# Patient Record
Sex: FEMALE | URBAN - METROPOLITAN AREA
[De-identification: names, ages, dates, MRNs, and addresses within clinical notes are randomized per-mention and may not be internally consistent; named-entity substitution may affect disease eponyms.]

---

## 2022-07-12 LAB
EXTERNAL HEMATOCRIT: 42 %
EXTERNAL HEMOGLOBIN: 14.5 G/DL
EXTERNAL HEPATITIS B SURFACE ANTIGEN: NEGATIVE
EXTERNAL HIV-1/2 AB-AG: NORMAL
EXTERNAL RUBELLA IGG QUANTITATION: NORMAL
EXTERNAL SYPHILIS RPR SCREEN: NORMAL

## 2022-07-21 LAB
EXTERNAL CHLAMYDIA SCREEN: NEGATIVE
EXTERNAL GONORRHEA SCREEN: NEGATIVE

## 2022-11-19 LAB
EXTERNAL HEMATOCRIT: 35.3 %
EXTERNAL HEMOGLOBIN: 12 G/DL
EXTERNAL PLATELET COUNT: 236 K/ÂΜL
EXTERNAL SYPHILIS RPR SCREEN: NORMAL
GLUCOSE 1H P 50 G GLC PO SERPL-MCNC: 101 MG/DL (ref 70–183)

## 2022-12-13 LAB
EXTERNAL ABO GROUPING: NORMAL
EXTERNAL ANTIBODY SCREEN: NORMAL
EXTERNAL RH FACTOR: POSITIVE

## 2023-02-01 ENCOUNTER — LAB REQUISITION (OUTPATIENT)
Dept: LAB | Facility: HOSPITAL | Age: 28
End: 2023-02-01

## 2023-02-01 DIAGNOSIS — Z36.85 ENCOUNTER FOR ANTENATAL SCREENING FOR STREPTOCOCCUS B: ICD-10-CM

## 2023-02-02 LAB — GP B STREP DNA SPEC QL NAA+PROBE: NEGATIVE

## 2023-02-14 ENCOUNTER — ANESTHESIA EVENT (INPATIENT)
Dept: ANESTHESIOLOGY | Facility: HOSPITAL | Age: 28
End: 2023-02-14

## 2023-02-14 ENCOUNTER — HOSPITAL ENCOUNTER (INPATIENT)
Facility: HOSPITAL | Age: 28
LOS: 2 days | Discharge: HOME | End: 2023-02-16
Attending: OBSTETRICS & GYNECOLOGY | Admitting: OBSTETRICS & GYNECOLOGY

## 2023-02-14 ENCOUNTER — ANESTHESIA (INPATIENT)
Dept: ANESTHESIOLOGY | Facility: HOSPITAL | Age: 28
End: 2023-02-14

## 2023-02-14 PROBLEM — Z3A.38 38 WEEKS GESTATION OF PREGNANCY: Status: ACTIVE | Noted: 2023-02-14

## 2023-02-14 PROBLEM — Z37.9 NORMAL LABOR: Status: ACTIVE | Noted: 2023-02-14

## 2023-02-14 LAB
ABO GROUP BLD: NORMAL
BASE EXCESS BLDCOA CALC-SCNC: -7.9 MMOL/L (ref 3–11)
BASE EXCESS BLDCOV CALC-SCNC: -5.4 MMOL/L (ref 1–9)
BLD GP AB SCN SERPL QL: NEGATIVE
ERYTHROCYTE [DISTWIDTH] IN BLOOD BY AUTOMATED COUNT: 13.2 % (ref 11.6–15.1)
HCO3 BLDCOA-SCNC: 17.7 MMOL/L (ref 17.3–27.3)
HCO3 BLDCOV-SCNC: 20.6 MMOL/L (ref 12.2–28.6)
HCT VFR BLD AUTO: 36.4 % (ref 34.8–46.1)
HGB BLD-MCNC: 12.3 G/DL (ref 11.5–15.4)
HOLD SPECIMEN: NORMAL
MCH RBC QN AUTO: 29.4 PG (ref 26.8–34.3)
MCHC RBC AUTO-ENTMCNC: 33.8 G/DL (ref 31.4–37.4)
MCV RBC AUTO: 87 FL (ref 82–98)
O2 CT VFR BLDCOA CALC: 12.8 ML/DL
OXYHGB MFR BLDCOA: 55.6 %
OXYHGB MFR BLDCOV: 57 %
PCO2 BLDCOA: 36.8 MM[HG] (ref 30–60)
PCO2 BLDCOV: 41.6 MM HG (ref 27–43)
PH BLDCOA: 7.3 [PH] (ref 7.23–7.43)
PH BLDCOV: 7.31 [PH] (ref 7.19–7.49)
PLATELET # BLD AUTO: 161 THOUSANDS/UL (ref 149–390)
PMV BLD AUTO: 11.5 FL (ref 8.9–12.7)
PO2 BLDCOA: 24.8 MM HG (ref 5–25)
PO2 BLDCOV: 25.5 MM HG (ref 15–45)
RBC # BLD AUTO: 4.18 MILLION/UL (ref 3.81–5.12)
RH BLD: POSITIVE
RPR SER QL: NORMAL
SAO2 % BLDCOV: 13.2 ML/DL
SPECIMEN EXPIRATION DATE: NORMAL
WBC # BLD AUTO: 13.69 THOUSAND/UL (ref 4.31–10.16)

## 2023-02-14 PROCEDURE — 0KQM0ZZ REPAIR PERINEUM MUSCLE, OPEN APPROACH: ICD-10-PCS | Performed by: OBSTETRICS & GYNECOLOGY

## 2023-02-14 RX ORDER — IBUPROFEN 600 MG/1
600 TABLET ORAL EVERY 6 HOURS SCHEDULED
Status: DISCONTINUED | OUTPATIENT
Start: 2023-02-14 | End: 2023-02-16 | Stop reason: HOSPADM

## 2023-02-14 RX ORDER — ACETAMINOPHEN 325 MG/1
650 TABLET ORAL EVERY 4 HOURS PRN
Status: DISCONTINUED | OUTPATIENT
Start: 2023-02-14 | End: 2023-02-15

## 2023-02-14 RX ORDER — OXYTOCIN/RINGER'S LACTATE 30/500 ML
250 PLASTIC BAG, INJECTION (ML) INTRAVENOUS ONCE
Status: COMPLETED | OUTPATIENT
Start: 2023-02-14 | End: 2023-02-14

## 2023-02-14 RX ORDER — SODIUM CHLORIDE, SODIUM LACTATE, POTASSIUM CHLORIDE, CALCIUM CHLORIDE 600; 310; 30; 20 MG/100ML; MG/100ML; MG/100ML; MG/100ML
125 INJECTION, SOLUTION INTRAVENOUS CONTINUOUS
Status: DISCONTINUED | OUTPATIENT
Start: 2023-02-14 | End: 2023-02-14

## 2023-02-14 RX ORDER — ROPIVACAINE HYDROCHLORIDE 2 MG/ML
INJECTION, SOLUTION EPIDURAL; INFILTRATION; PERINEURAL AS NEEDED
Status: DISCONTINUED | OUTPATIENT
Start: 2023-02-14 | End: 2023-02-14 | Stop reason: HOSPADM

## 2023-02-14 RX ORDER — SIMETHICONE 80 MG
80 TABLET,CHEWABLE ORAL 4 TIMES DAILY PRN
Status: DISCONTINUED | OUTPATIENT
Start: 2023-02-14 | End: 2023-02-16 | Stop reason: HOSPADM

## 2023-02-14 RX ORDER — DOCUSATE SODIUM 100 MG/1
100 CAPSULE, LIQUID FILLED ORAL 2 TIMES DAILY
Status: DISCONTINUED | OUTPATIENT
Start: 2023-02-14 | End: 2023-02-16 | Stop reason: HOSPADM

## 2023-02-14 RX ORDER — ONDANSETRON 2 MG/ML
4 INJECTION INTRAMUSCULAR; INTRAVENOUS EVERY 8 HOURS PRN
Status: DISCONTINUED | OUTPATIENT
Start: 2023-02-14 | End: 2023-02-16 | Stop reason: HOSPADM

## 2023-02-14 RX ORDER — DIPHENHYDRAMINE HCL 25 MG
25 TABLET ORAL EVERY 6 HOURS PRN
Status: DISCONTINUED | OUTPATIENT
Start: 2023-02-14 | End: 2023-02-16 | Stop reason: HOSPADM

## 2023-02-14 RX ORDER — DIAPER,BRIEF,INFANT-TODD,DISP
1 EACH MISCELLANEOUS DAILY PRN
Status: DISCONTINUED | OUTPATIENT
Start: 2023-02-14 | End: 2023-02-16 | Stop reason: HOSPADM

## 2023-02-14 RX ORDER — LIDOCAINE HYDROCHLORIDE AND EPINEPHRINE 15; 5 MG/ML; UG/ML
INJECTION, SOLUTION EPIDURAL AS NEEDED
Status: DISCONTINUED | OUTPATIENT
Start: 2023-02-14 | End: 2023-02-14 | Stop reason: HOSPADM

## 2023-02-14 RX ORDER — OXYTOCIN/RINGER'S LACTATE 30/500 ML
PLASTIC BAG, INJECTION (ML) INTRAVENOUS
Status: COMPLETED
Start: 2023-02-14 | End: 2023-02-14

## 2023-02-14 RX ORDER — CALCIUM CARBONATE 200(500)MG
1000 TABLET,CHEWABLE ORAL DAILY PRN
Status: DISCONTINUED | OUTPATIENT
Start: 2023-02-14 | End: 2023-02-16 | Stop reason: HOSPADM

## 2023-02-14 RX ORDER — BUPIVACAINE HYDROCHLORIDE 2.5 MG/ML
30 INJECTION, SOLUTION EPIDURAL; INFILTRATION; INTRACAUDAL ONCE AS NEEDED
Status: DISCONTINUED | OUTPATIENT
Start: 2023-02-14 | End: 2023-02-14

## 2023-02-14 RX ORDER — ONDANSETRON 2 MG/ML
4 INJECTION INTRAMUSCULAR; INTRAVENOUS EVERY 6 HOURS PRN
Status: DISCONTINUED | OUTPATIENT
Start: 2023-02-14 | End: 2023-02-14

## 2023-02-14 RX ADMIN — Medication 250 MILLI-UNITS/MIN: at 06:47

## 2023-02-14 RX ADMIN — ROPIVACAINE HYDROCHLORIDE 4 ML: 2 INJECTION EPIDURAL; INFILTRATION; PERINEURAL at 02:29

## 2023-02-14 RX ADMIN — ROPIVACAINE HYDROCHLORIDE: 2 INJECTION, SOLUTION EPIDURAL; INFILTRATION at 02:39

## 2023-02-14 RX ADMIN — IBUPROFEN 600 MG: 600 TABLET, FILM COATED ORAL at 17:58

## 2023-02-14 RX ADMIN — ONDANSETRON HYDROCHLORIDE 4 MG: 2 SOLUTION INTRAMUSCULAR; INTRAVENOUS at 01:39

## 2023-02-14 RX ADMIN — DOCUSATE SODIUM 100 MG: 100 CAPSULE, LIQUID FILLED ORAL at 17:58

## 2023-02-14 RX ADMIN — SODIUM CHLORIDE, SODIUM LACTATE, POTASSIUM CHLORIDE, AND CALCIUM CHLORIDE 125 ML/HR: .6; .31; .03; .02 INJECTION, SOLUTION INTRAVENOUS at 04:11

## 2023-02-14 RX ADMIN — ROPIVACAINE HYDROCHLORIDE 10 ML/HR: 2 INJECTION EPIDURAL; INFILTRATION; PERINEURAL at 02:39

## 2023-02-14 RX ADMIN — SODIUM CHLORIDE, SODIUM LACTATE, POTASSIUM CHLORIDE, AND CALCIUM CHLORIDE 999 ML/HR: .6; .31; .03; .02 INJECTION, SOLUTION INTRAVENOUS at 02:12

## 2023-02-14 RX ADMIN — ROPIVACAINE HYDROCHLORIDE 4 ML: 2 INJECTION EPIDURAL; INFILTRATION; PERINEURAL at 02:33

## 2023-02-14 RX ADMIN — IBUPROFEN 600 MG: 600 TABLET, FILM COATED ORAL at 07:40

## 2023-02-14 RX ADMIN — WITCH HAZEL 1 PAD.: 500 SOLUTION RECTAL; TOPICAL at 09:06

## 2023-02-14 RX ADMIN — LIDOCAINE HYDROCHLORIDE AND EPINEPHRINE 3 ML: 15; 5 INJECTION, SOLUTION EPIDURAL at 02:25

## 2023-02-14 RX ADMIN — SODIUM CHLORIDE, SODIUM LACTATE, POTASSIUM CHLORIDE, AND CALCIUM CHLORIDE 999 ML/HR: .6; .31; .03; .02 INJECTION, SOLUTION INTRAVENOUS at 01:15

## 2023-02-14 RX ADMIN — IBUPROFEN 600 MG: 600 TABLET, FILM COATED ORAL at 12:33

## 2023-02-14 RX ADMIN — BENZOCAINE AND LEVOMENTHOL 1 APPLICATION.: 200; 5 SPRAY TOPICAL at 09:06

## 2023-02-14 NOTE — OB LABOR/OXYTOCIN SAFETY PROGRESS
Labor Progress Note - Mook Childers 33 y o  female MRN: [id number]    Unit/Bed#: L&D 323-01 Encounter: [id number]                 Cervical Dilation: 7        Cervical Effacement: 100  Fetal Station: 0     Fetal Heart Rate: 145 BPM                  Vital Signs:   Vitals:    02/14/23 0034   BP: 139/86   Pulse: 77   Resp: 18   Temp: 97 6 °F (36 4 °C)       Notes/comments:     Called to the bedside as patient feels the urge to push  On cervical exam, she is 7/100/0 and still intact  She is very uncomfortable and requesting an epidural  FHT is category I  Plan for epidural at this time  D/W Dr Ellen Parker         [place], [place] 2/14/2023 2:15 AM

## 2023-02-14 NOTE — LACTATION NOTE
This note was copied from a baby's chart  CONSULT - LACTATION  Baby Boy Michelle Keane) Mary 0 days male MRN: [id number]    [facility] Room / Bed: L&D 304(N)/L&D 304(n) Encounter: [id number]    Maternal Information     MOTHER:  Tameka Reza  Maternal Age: 33 y o    OB History: # 1 - Date: 23, Sex: Male, Weight: 2955 g (6 lb 8 2 oz), GA: 38w4d, Delivery: Vaginal, Spontaneous, Apgar1: 7, Apgar5: 9, Living: Living, Birth Comments: None   Previouse breast reduction surgery? No    Lactation history:   Has patient previously breast fed: No   How long had patient previously breast fed:     Previous breast feeding complications:       Past Surgical History:   Procedure Laterality Date   • TONSILLECTOMY  2014        Birth information:  YOB: 2022   Time of birth: 6:44 AM   Sex: male   Delivery type: Vaginal, Spontaneous   Birth Weight: 2955 g (6 lb 8 2 oz)   Percent of Weight Change: 0%     Gestational Age: 38w3d   [unfilled]    Assessment     Breast and nipple assessment: normal assessment    [place] Assessment: normal assessment    Feeding assessment: feeding well  LATCH:  Latch: Grasps breast, tongue down, lips flanged, rhythmic sucking   Audible Swallowing: Spontaneous and intermittent (24 hours old)   Type of Nipple: Everted (After stimulation)   Comfort (Breast/Nipple): Soft/non-tender   Hold (Positioning): Full assist, staff holds infant at breast   LATCH Score: 8         Having latch problems? 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[employer]; Football   Breasts/Nipples   Left Breast Soft   Right Breast Soft   Left Nipple Everted   Right Nipple Everted   Intervention Hand expression   Breastfeeding Progress Not yet established;Breastfeeding well   Patient Follow-Up   Lactation Consult Status 2   Follow-Up Type Inpatient;Call as needed   Other OB Lactation Documentation    Additional Problem Noted Family called for assistance with feeding Michael at the breast   (Reviewed RSB  D/C booklet at bedside)       Feeding recommendations:  breast feed on demand     Information on hand expression given  Discussed benefits of knowing how to manually express breast including stimulating milk supply, softening nipple for latch and evacuating breast in the event of engorgement  Reviewed how to bring baby to the breast so that his lower lip and chin touch the breast with his nose just above the nipple to encourage a wider, more asymmetric latch  Met with mother  Provided mother with Ready, Set, Baby booklet which contained information on:  Hand expression with access to QR codes to review hand expression  Positioning and latch reviewed as well as showing images of other feeding positions  Discussed the properties of a good latch in any position  Feeding on cue and what that means for recognizing infant's hunger, s/s that baby is getting enough milk and some s/s that breastfeeding dyad may need further help  Skin to Skin contact an benefits to mom and baby  Avoidance of pacifiers for the first month discussed  Gave information on common concerns, what to expect the first few weeks after delivery, preparing for other caregivers, and how partners can help  Resources for support also provided  Encouraged parents to call for assistance, questions, and concerns about breastfeeding  Extension provided        Amado Fairbanks RN 2/14/2023 11:49 AM

## 2023-02-14 NOTE — H&P
H&P Exam - Obstetrics   Brii Cardenas 33 y o  female MRN: [id number]  Unit/Bed#: L&D 323-01 Encounter: [id number]      ASSESSMENT:  33 y  o yo  at 38w4d weeks gestation who is being admitted for active labor  EFW: 7lbs by Leopold's  VTX by transabdominal ultrasound    PLAN:    38 weeks gestation of pregnancy  Assessment & Plan  38w4d  Intact  GBS neg  Expectant management    * Normal labor  Assessment & Plan  Admit to L&D  CBC, RPR, Type & Screen  SVE:   FHT: category I  Clinical EFW: 7lbs by Leopold's ; Vertex confirmed by TAUS  GBS status: negative   Postpartum contraception plan: declines  Analgesia at maternal request  Expectant management      Discussed with Dr Georgia Lea      This patient will be an INPATIENT  and I certify the anticipated length of stay is >2 Midnights  History of Present Illness     Chief Complaint: Active labor    HPI:  Brii Cardenas is a 33 y o   female with an STALIN of 2023, by Last Menstrual Period at 38w4d weeks gestation who is being admitted for active labor  She states that she was having contractions [id number] until on 23 AM  She was able to go to work  She states that at 4pm she started having regular strong contractions  She is unsure if she broke her water, but states that she had a "large gush" at the time when her contractions got stronger  She denies vaginal bleeding and decreased fetal movement  Contractions: yes q2-3 mins  Loss of fluid: yes  Vaginal bleeding: no  Fetal movement: yes    She is SOLO patient  PREGNANCY COMPLICATIONS:   1) Pregnancy at 38w4d    OB History    Para Term  AB Living   1             SAB IAB Ectopic Multiple Live Births                  # Outcome Date GA Lbr Jeronimo/2nd Weight Sex Delivery Anes PTL Lv   1 Current                Baby complications/comments: none    Review of Systems   Constitutional: Negative for chills and fever  HENT: Negative for ear pain and sore throat      Eyes: Negative for pain and visual disturbance  Respiratory: Negative for cough and shortness of breath  Cardiovascular: Negative for chest pain and palpitations  Gastrointestinal: Positive for abdominal pain and diarrhea  Negative for vomiting  Genitourinary: Negative for dysuria and hematuria  Musculoskeletal: Negative for arthralgias and back pain  Skin: Negative for color change and rash  Neurological: Negative for seizures and syncope  All other systems reviewed and are negative  Historical Information   No past medical history on file  No past surgical history on file  Social History   Social History     Substance and Sexual Activity   Alcohol Use Not on file     Social History     Substance and Sexual Activity   Drug Use Not on file     Social History     Tobacco Use   Smoking Status Not on file   Smokeless Tobacco Not on file     Family History: non-contributory    Meds/Allergies      No medications prior to admission  No Known Allergies    OBJECTIVE:    Vitals: Blood pressure 139/86, pulse 77, temperature 97 6 °F (36 4 °C), temperature source Oral, resp  rate 18, height 5' 6" (1 676 m), weight 72 6 kg (160 lb), last menstrual period 05/20/2022  Body mass index is 25 82 kg/m²  Physical Exam  Vitals reviewed  Constitutional:       Appearance: Normal appearance  HENT:      Head: Normocephalic and atraumatic  Eyes:      Extraocular Movements: Extraocular movements intact  Cardiovascular:      Rate and Rhythm: Normal rate  Pulses: Normal pulses  Pulmonary:      Effort: Pulmonary effort is normal  No respiratory distress  Abdominal:      Palpations: Abdomen is soft  Tenderness: There is no abdominal tenderness  Comments: Gravid uterus   Genitourinary:     General: Normal vulva  Musculoskeletal:         General: Normal range of motion  Cervical back: Normal range of motion  Skin:     General: Skin is warm and dry  Neurological:      Mental Status: She is alert  Psychiatric:         Mood and Affect: Mood normal          Behavior: Behavior normal        Speculum exam: visually dilated w/intact membranes    Cervix:  Cervical Dilation: 6  Cervical Effacement: 90  Cervical Consistency: Soft  Fetal Station: -1  Presentation: Vertex  Method: Manual  OB Examiner: Javier    Fetal heart rate:    category I    [place]:    q2-3mins    GBS: negative    Prenatal Labs: I have personally reviewed pertinent reports    , Blood Type:   Lab Results   Component Value Date/Time    ABO Grouping A 12/13/2022 12:00 AM     , D (Rh type): No results found for: RH  , Antibody Screen: No results found for: ANTIBODYSCR , HCT/HGB:   Lab Results   Component Value Date/Time    Hematocrit 36 4 02/14/2023 01:13 AM    Hemoglobin 12 3 02/14/2023 01:13 AM      , MCV:   Lab Results   Component Value Date/Time    MCV 87 02/14/2023 01:13 AM      , Platelets:   Lab Results   Component Value Date/Time    Platelets 307 84/93/0986 01:13 AM      , 1 hour Glucola:   Lab Results   Component Value Date/Time    Glucose 101 11/19/2022 12:00 AM   , 3 hour GTT: No results found for: YNSOIYT0RY, Varicella: No results found for: VARICELLAIGG    , Rubella: No results found for: RUBELLAIGGQT     , VDRL/RPR:   Lab Results   Component Value Date/Time    RPR Non-Reactive 11/19/2022 12:00 AM      , Urine Culture/Screen: No results found for: URINECX    , Urine Drug Screen: No results found for: AMPHETUR, BARBTUR, BDZUR, THCUR, COCAINEUR, METHADONEUR, OPIATEUR, PCPUR, MTHAMUR, ECSTASYUR, TRICYCLICSUR, Hep B:   Lab Results   Component Value Date/Time    Hepatitis B Surface Ag negative 07/12/2022 12:00 AM     , Hep C: No components found for: HEPCSAG, EXTHEPCSAG   , HIV:   Lab Results   Component Value Date/Time    HIV-1/HIV-2 AB Non-Reactive 07/12/2022 12:00 AM     , Chlamydia:   Lab Results   Component Value Date/Time    External Chlamydia Screen negative 07/21/2022 12:00 AM     , Gonorrhea: No results found for: LABNGO  , Group B Strep:    Lab Results   Component Value Date/Time    Strep Grp B PCR Negative 01/31/2023 04:52 PM          Invasive Devices     Peripheral Intravenous Line  Duration           Peripheral IV 02/14/23 Left Hand <1 day                Aye Hurley MD  OBGYN PGY-2  2/14/2023  1:02 AM

## 2023-02-14 NOTE — OB LABOR/OXYTOCIN SAFETY PROGRESS
Labor Progress Note - Serina Fearing 33 y o  female MRN: [id number]    Unit/Bed#: L&D 323-01 Encounter: [id number]       Contraction Frequency (minutes): 3-3 5  Contraction Quality: Moderate  Tachysystole: No   Cervical Dilation: 7        Cervical Effacement: 100  Fetal Station: 0  Baseline Rate: 135 bpm  Fetal Heart Rate: 145 BPM     Vital Signs:   Vitals:    02/14/23 0249   BP: 113/61   Pulse: 101   Resp:    Temp:        Notes/comments:     Present at the bedside for prolonged 5-6 minute deceleration with a pool to the 70s  On cervical exam, she is unchanged at 7/100/0  FHT returned to baseline with occasional variable decelerations with changes in maternal position and oxygen administration  Dr Rochelle Yap made aware  Plan to continue expectant management       Mavis Carrero [place] 2/14/2023 3:04 AM

## 2023-02-14 NOTE — ANESTHESIA POSTPROCEDURE EVALUATION
Post-Op Assessment Note    CV Status:  Stable    Pain management: adequate     Mental Status:  Alert and awake   Hydration Status:  Euvolemic   PONV Controlled:  Controlled   Airway Patency:  Patent      Post Op Vitals Reviewed: Yes      Staff: Anesthesiologist         No notable events documented      /78 (02/14/23 0706)    Temp      Pulse 91 (02/14/23 0706)   Resp      SpO2

## 2023-02-14 NOTE — ANESTHESIA PREPROCEDURE EVALUATION
Procedure:  LABOR ANALGESIA    Relevant Problems   GYN   (+) 38 weeks gestation of pregnancy        Physical Exam    Airway    Mallampati score: II         Dental   No notable dental hx     Cardiovascular  Cardiovascular exam normal    Pulmonary  Pulmonary exam normal     Other Findings        Anesthesia Plan  ASA Score- 2     Anesthesia Type- epidural with ASA Monitors  Additional Monitors:   Airway Plan:           Plan Factors-    Chart reviewed  Existing labs reviewed  Patient is not a current smoker  Patient instructed to abstain from smoking on day of procedure  Patient did not smoke on day of surgery  Obstructive sleep apnea risk education given perioperatively  Induction-     Postoperative Plan-     Informed Consent- Anesthetic plan and risks discussed with patient

## 2023-02-14 NOTE — ANESTHESIA PROCEDURE NOTES
Epidural Block    Patient location during procedure: OB  Start time: 2/14/2023 2:24 AM  Reason for block: procedure for pain and at surgeon's request  Staffing  Performed: Anesthesiologist   Anesthesiologist: Benedict Ta DO  Preanesthetic Checklist  Completed: patient identified, IV checked, site marked, risks and benefits discussed, surgical consent, monitors and equipment checked, pre-op evaluation and timeout performed  Epidural  Patient position: sitting  Prep: Betadine  Patient monitoring: frequent blood pressure checks  Location: lumbar  Injection technique: DONNA air  Needle  Needle type: Tuohy   Needle gauge: 18 G  Catheter type: side hole  Catheter size: 20 G  Catheter at skin depth: 11 cm  Catheter securement method: clear occlusive dressing  Test dose: negative  Assessment  Number of attempts: 1negative aspiration for CSF, negative aspiration for heme and no paresthesia on injection  patient tolerated the procedure well with no immediate complications

## 2023-02-14 NOTE — L&D DELIVERY NOTE
DELIVERY NOTE  Torrey Carter 33 y o  female MRN: [id number]  Unit/Bed#: L&D 323-01 Encounter: [id number]    Obstetrician:    Dr Tamir Kauffman DO    Assistant:   Dr Allison Alicia    Pre-Delivery Diagnosis:   Patient Active Problem List   Diagnosis   • 38 weeks gestation of pregnancy   • Normal labor     Post-Delivery Diagnosis:   Same as above - Delivered  Viable male fetus  2nd degree laceration    Procedure:  Spontaneous vaginal delivery  Repair 2nd degree spontaneous laceration      Findings:  1  Viable male  delivered on 23 at [phone] with weight pending at time of dictation,  Apgar scores of 7 at one minute and 9 at five minutes  2  Spontaneous delivery of placenta with centrally inserted 3-vessel cord  3  Clear amniotic fluid  4  2nd degree perineal laceration, repaired with 3-0 vicryl rapide    Specimens:   Cord blood obtained   Placenta; normal appearing, central insertion, intact   Arterial and venous blood gases (below)     Gases:  Umbilical Cord Venous Blood Gas:  Results from last 7 days   Lab Units 23  0645   PH COV  7 312   PCO2 COV mm HG 41 6   HCO3 COV mmol/L 20 6   BASE EXC COV mmol/L -5 4*   O2 CT CD VB mL/dL 13 2   O2 HGB, VENOUS CORD % 58 2     Umbilical Cord Arterial Blood Gas:  Results from last 7 days   Lab Units 23  0645   PH COA  7 300   PCO2 COA  36 8   PO2 COA mm HG 24 8   HCO3 COA mmol/L 17 7   BASE EXC COA mmol/L -7 9*   O2 CONTENT CORD ART ml/dl 12 8   O2 HGB, ARTERIAL CORD % 55 6       Quantitative Blood Loss:   0 mL           Complications:    None       Brief labor course:   Torrey Carter is a 33 y o   at 38w3d  She presented to labor and delivery for contractions  Her pregnancy was uncomplicated  On exam in triage she was noted to be 1  She was admitted for active labor  She received an epidural for pain management  FHT was mostly category I with occasional variable and one prolonged deceleration which resolved with maternal repositioning   She continued to make change to completely dilated and began pushing  Procedure Details      Description of procedure     After pushing for 48 minutes, on 23 at 0644 patient delivered a viable male , Apgars of 7 (1 min) and 9 (5 min)  The fetal vertex delivered spontaneously  There was no nuchal cord  With the assistance of maternal expulsive efforts and gentle downward traction of the fetal head, the anterior (right) shoulder was delivered without difficulty, followed by the remainder of the infant's body and contralateral arm  After delivery of the , delayed clamping of the umbilical cord was undertaken for 30 seconds  The  was noted to have good tone and cry spontaneously  There was no apparent injury to the   The cord was then doubly clamped and cut and the  was passed off to  staff for routine care  Umbilical cord blood and umbilical artery and venous gases were collected  Placenta was delivered with fundal massage and gentle traction on the cord with active management of the third stage of labor  Placenta delivered intact with a 3-vessel cord  Active management of the third stage of labor was undertaken with IV pitocin at 250 milliunits/min  Bleeding was noted to be under control   Outcome:  Living  with APGARS 7, 9 at 1 and 5 minutes, respectively   weight pending    Perineal Inspection/Laceration Repair    The vagina, cervix, perineum, and rectum were inspected and there was noted to be a 2nd degree laceration that required repair  Under adequate anesthesia, the apex of the vaginal laceration was identified and an anchoring suture was placed 1 cm above the apex  The vaginal mucosa and underlying rectovaginal fascia were closed using a running locked 3-0 Vicryl-CT 1 suture to the hymenal ring  The suture was then brought underneath the hymenal ring  A stitch was then placed through the bulbocavernosus muscle and tied  Continuing with the same suture, the transverse perineal muscles were reapproximated with 2 transverse running sutures  The suture was brought to the posterior apex of the skin laceration and then the skin was reapproximated in a subcuticular fashion to the hymenal ring  Excellent hemostasis and cosmesis was achieved  Conclusion:  Mother and baby are currently recovering nicely in stable condition  Attending Supervision:   Dr Amparo Badillo, DO was present for the entire procedure  Chantel Garcia MD  OB/GYN PGY-2  2/14/2023  7:06 AM

## 2023-02-14 NOTE — ASSESSMENT & PLAN NOTE
Recovering well   Encourage Ambulation  Encourage breastfeeding  Tylenol/Motrin for pain   Rh +, rhogam not indicated   Undecided about discharge home today vs tomorrow

## 2023-02-14 NOTE — OB LABOR/OXYTOCIN SAFETY PROGRESS
Labor Progress Note - Klever Magdaleno 33 y o  female MRN: [id number]    Unit/Bed#: L&D 323-01 Encounter: [id number]       Contraction Frequency (minutes): 3-3 5  Contraction Quality: Moderate  Tachysystole: No   Cervical Dilation: Lip/rim (Comment)        Cervical Effacement: 100  Fetal Station: 2  Baseline Rate: 135 bpm  Fetal Heart Rate: 145 BPM    Vital Signs:   Vitals:    02/14/23 0449   BP: 95/54   Pulse: 75   Resp:    Temp:    SpO2:        Notes/comments:     Arcelia Schmidt is feeling intermittent pressure  On cervical exam, she is 9 5/100/+2  FHT is category I  Approximately 1 hour she was intermittent category II with intermittent variable decelerations  Discussed with Dr Bay Rand  Plan to recheck in 1 hour or sooner as clinically indicated         [place], [place] 2/14/2023 5:07 AM

## 2023-02-14 NOTE — PLAN OF CARE
Problem: PAIN - ADULT  Goal: Verbalizes/displays adequate comfort level or baseline comfort level  Description: Interventions:  - Encourage patient to monitor pain and request assistance  - Assess pain using appropriate pain scale  - Administer analgesics based on type and severity of pain and evaluate response  - Implement non-pharmacological measures as appropriate and evaluate response  - Consider cultural and social influences on pain and pain management  - Notify physician/advanced practitioner if interventions unsuccessful or patient reports new pain  Outcome: Progressing     Problem: INFECTION - ADULT  Goal: Absence or prevention of progression during hospitalization  Description: INTERVENTIONS:  - Assess and monitor for signs and symptoms of infection  - Monitor lab/diagnostic results  - Monitor all insertion sites, i e  indwelling lines, tubes, and drains  - Monitor endotracheal if appropriate and nasal secretions for changes in amount and color  - [place] appropriate cooling/warming therapies per order  - Administer medications as ordered  - Instruct and encourage patient and family to use good hand hygiene technique  - Identify and instruct in appropriate isolation precautions for identified infection/condition  Outcome: Progressing  Goal: Absence of fever/infection during neutropenic period  Description: INTERVENTIONS:  - Monitor WBC    Outcome: Progressing     Problem: SAFETY ADULT  Goal: Patient will remain free of falls  Description: INTERVENTIONS:  - Educate patient/family on patient safety including physical limitations  - Instruct patient to call for assistance with activity   - Consult OT/PT to assist with strengthening/mobility   - Keep Call bell within reach  - Keep bed low and locked with side rails adjusted as appropriate  - Keep care items and personal belongings within reach  - Initiate and maintain comfort rounds  Outcome: Progressing  Goal: Maintain or return to baseline ADL function  Description: INTERVENTIONS:  -  Assess patient's ability to carry out ADLs; assess patient's baseline for ADL function and identify physical deficits which impact ability to perform ADLs (bathing, care of mouth/teeth, toileting, grooming, dressing, etc )  - Assess/evaluate cause of self-care deficits   - Assess range of motion  - Assess patient's mobility; develop plan if impaired  - Assess patient's need for assistive devices and provide as appropriate  - Encourage maximum independence but intervene and supervise when necessary  - Involve family in performance of ADLs  - Assess for home care needs following discharge   - Consider OT consult to assist with ADL evaluation and planning for discharge  - Provide patient education as appropriate  Outcome: Progressing  Goal: Maintains/Returns to pre admission functional level  Description: INTERVENTIONS:  - Perform BMAT or MOVE assessment daily    - Set and communicate daily mobility goal to care team and patient/family/caregiver  - Collaborate with rehabilitation services on mobility goals if consulted  - Record patient progress and toleration of activity level   Outcome: Progressing     Problem: Knowledge Deficit  Goal: Patient/family/caregiver demonstrates understanding of disease process, treatment plan, medications, and discharge instructions  Description: Complete learning assessment and assess knowledge base    Interventions:  - Provide teaching at level of understanding  - Provide teaching via preferred learning methods  Outcome: Progressing     Problem: DISCHARGE PLANNING  Goal: Discharge to home or other facility with appropriate resources  Description: INTERVENTIONS:  - Identify barriers to discharge w/patient and caregiver  - Arrange for needed discharge resources and transportation as appropriate  - Identify discharge learning needs (meds, wound care, etc )  - Arrange for interpretive services to assist at discharge as needed  - Refer to Case Management Department for coordinating discharge planning if the patient needs post-hospital services based on physician/advanced practitioner order or complex needs related to functional status, cognitive ability, or social support system  Outcome: Progressing

## 2023-02-14 NOTE — DISCHARGE SUMMARY
Discharge Summary - OB/GYN  Bambi Wheeler 33 y o  female MRN: [id number]  Unit/Bed#: L&D 323-01 Encounter: [id number]    Admission Date: 2023     Discharge Date: 2023    Admitting Attending: Michael Arthur DO    Delivering Attending: Dr Roseanne Arevalo DO    Discharging Attending: Dr Hima Williamson DO    Principal Diagnosis: Pregnancy at 38w4d    Secondary Diagnosis:   1  None    Procedures: spontaneous vaginal delivery    Anesthesia: epidural    Hospital course: Bambi Wheeler is a 33 y o   at 38w3d  She presented to labor and delivery for contractions  Her pregnancy was uncomplicated  On exam in triage she was noted to be /1  She was admitted for active labor  She received an epidural for pain management  FHT was mostly category I with occasional variable and one prolonged deceleration which resolved with maternal repositioning  She continued to make change to completely dilated and began pushing  She delivered a viable male  on 2023 at [phone]  Weight 6lbs 8 2oz via normal spontaneous vaginal delivery  She sustained a 2nd degree laceration during delivery which was adequately repaired  Apgars were 7 (1 min) and 9 (5 min)   was transferred to  nursery  Patient tolerated the procedure well  Her post-delivery course was uncomplicated  Her postpartum pain was well controlled with oral analgesics  On day of discharge, she was ambulating and able to reasonably perform all ADLs  She was voiding and had appropriate bowel function  Pain was well controlled  She was discharged home on postpartum day #2 without complications  Patient was instructed to follow up with her OB as an outpatient and was given appropriate warnings to call provider if she develops signs of infection or uncontrolled pain      Complications: none apparent    Condition at discharge: stable     Discharge instructions/Information to patient and family:   -Do not place anything (no partner, tampons or douche) in your vagina for 6 weeks  -You may walk for exercise for the first 6 weeks then gradually return to your usual activities    -Please do not drive for 1 week if you have no stitches and for 2 weeks if you have stitches or underwent a  delivery     -You may take baths or shower per your preference    -Please look at your bust (breasts) in the mirror daily and call your doctor for redness or tenderness or increased warmth  - If you have had a  section please look at your incision daily as well and call provider for increasing redness or steady drainage from the incision    -Please call your doctor's office if temperature > 100 4*F or 38* C, worsening pain or a foul discharge   -See after visit summary for more information for patient and family  Provisions for Follow-Up Care:  See after visit summary for information related to follow-up care and any pertinent home health orders  Disposition: See After Visit Summary for discharge disposition information  Planned Readmission: No    Discharge medications and instructions:   Prenatal vitamin daily for 6 months or the duration of nursing, whichever is longer    Motrin 600 mg orally every 6 hours as needed for pain  Tylenol (over the counter) per bottle directions as needed for pain  Hydrocortisone cream 1% (over the counter) applied 1-2x daily to perineum as needed  Witch hazel pads for perineal discomfort as needed    Lucy Foster MD

## 2023-02-14 NOTE — LACTATION NOTE
This note was copied from a baby's chart  02/14/23 1400   Lactation Consultation   Reason for Consult 10 min   LATCH Documentation   Latch 2   Audible Swallowing 2   Type of Nipple 2   Comfort (Breast/Nipple) 2   Hold (Positioning) 1   LATCH Score 9   Having latch problems? 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Football   Breasts/Nipples   Intervention Hand expression   Breastfeeding Progress Not yet established;Breastfeeding well   Patient Follow-Up   Lactation Consult Status 2   Follow-Up Type Inpatient;Call as needed   Other OB Lactation Documentation    Additional Problem Noted Family called for assistance with latching Michael  Reassurance with positioning offered  Gamal Ford was able to [address] with less intervention  Encouraged parents to call for assistance, questions, and concerns about breastfeeding  Extension provided

## 2023-02-15 RX ORDER — ACETAMINOPHEN 325 MG/1
650 TABLET ORAL EVERY 6 HOURS SCHEDULED
Refills: 0 | Status: CANCELLED
Start: 2023-02-15

## 2023-02-15 RX ORDER — ACETAMINOPHEN 325 MG/1
650 TABLET ORAL EVERY 6 HOURS SCHEDULED
Status: DISCONTINUED | OUTPATIENT
Start: 2023-02-15 | End: 2023-02-16 | Stop reason: HOSPADM

## 2023-02-15 RX ADMIN — DOCUSATE SODIUM 100 MG: 100 CAPSULE, LIQUID FILLED ORAL at 18:05

## 2023-02-15 RX ADMIN — ACETAMINOPHEN 650 MG: 325 TABLET ORAL at 18:05

## 2023-02-15 RX ADMIN — ACETAMINOPHEN 650 MG: 325 TABLET ORAL at 12:42

## 2023-02-15 RX ADMIN — IBUPROFEN 600 MG: 600 TABLET, FILM COATED ORAL at 18:05

## 2023-02-15 RX ADMIN — IBUPROFEN 600 MG: 600 TABLET, FILM COATED ORAL at 12:42

## 2023-02-15 RX ADMIN — DOCUSATE SODIUM 100 MG: 100 CAPSULE, LIQUID FILLED ORAL at 09:14

## 2023-02-15 RX ADMIN — ACETAMINOPHEN 650 MG: 325 TABLET ORAL at 06:37

## 2023-02-15 RX ADMIN — IBUPROFEN 600 MG: 600 TABLET, FILM COATED ORAL at 06:37

## 2023-02-15 RX ADMIN — IBUPROFEN 600 MG: 600 TABLET, FILM COATED ORAL at 23:08

## 2023-02-15 RX ADMIN — IBUPROFEN 600 MG: 600 TABLET, FILM COATED ORAL at 00:18

## 2023-02-15 NOTE — PLAN OF CARE
Problem: PAIN - ADULT  Goal: Verbalizes/displays adequate comfort level or baseline comfort level  Description: Interventions:  - Encourage patient to monitor pain and request assistance  - Assess pain using appropriate pain scale  - Administer analgesics based on type and severity of pain and evaluate response  - Implement non-pharmacological measures as appropriate and evaluate response  - Consider cultural and social influences on pain and pain management  - Notify physician/advanced practitioner if interventions unsuccessful or patient reports new pain  Outcome: Progressing     Problem: INFECTION - ADULT  Goal: Absence or prevention of progression during hospitalization  Description: INTERVENTIONS:  - Assess and monitor for signs and symptoms of infection  - Monitor lab/diagnostic results  - Monitor all insertion sites, i e  indwelling lines, tubes, and drains  - Monitor endotracheal if appropriate and nasal secretions for changes in amount and color  - [place] appropriate cooling/warming therapies per order  - Administer medications as ordered  - Instruct and encourage patient and family to use good hand hygiene technique  - Identify and instruct in appropriate isolation precautions for identified infection/condition  Outcome: Progressing  Goal: Absence of fever/infection during neutropenic period  Description: INTERVENTIONS:  - Monitor WBC    Outcome: Progressing     Problem: SAFETY ADULT  Goal: Patient will remain free of falls  Description: INTERVENTIONS:  - Educate patient/family on patient safety including physical limitations  - Instruct patient to call for assistance with activity   - Consult OT/PT to assist with strengthening/mobility   - Keep Call bell within reach  - Keep bed low and locked with side rails adjusted as appropriate  - Keep care items and personal belongings within reach  - Initiate and maintain comfort rounds  - Make Fall Risk Sign visible to staff  - Offer Toileting every Hours, in advance of need  - Initiate/Maintain alarm  - Obtain necessary fall risk management equipment:  - Apply yellow socks and bracelet for high fall risk patients  - Consider moving patient to room near nurses station  Outcome: Progressing  Goal: Maintain or return to baseline ADL function  Description: INTERVENTIONS:  -  Assess patient's ability to carry out ADLs; assess patient's baseline for ADL function and identify physical deficits which impact ability to perform ADLs (bathing, care of mouth/teeth, toileting, grooming, dressing, etc )  - Assess/evaluate cause of self-care deficits   - Assess range of motion  - Assess patient's mobility; develop plan if impaired  - Assess patient's need for assistive devices and provide as appropriate  - Encourage maximum independence but intervene and supervise when necessary  - Involve family in performance of ADLs  - Assess for home care needs following discharge   - Consider OT consult to assist with ADL evaluation and planning for discharge  - Provide patient education as appropriate  Outcome: Progressing  Goal: Maintains/Returns to pre admission functional level  Description: INTERVENTIONS:  - Perform BMAT or MOVE assessment daily    - Set and communicate daily mobility goal to care team and patient/family/caregiver  - Collaborate with rehabilitation services on mobility goals if consulted  - Perform Range of Motion  times a day  - Reposition patient every  hours    - Dangle patient  times a day  - Stand patient times a day  - Ambulate patient  times a day  - Out of bed to chair  times a day   - Out of bed for meals  times a day  - Out of bed for toileting  - Record patient progress and toleration of activity level   Outcome: Progressing     Problem: Knowledge Deficit  Goal: Patient/family/caregiver demonstrates understanding of disease process, treatment plan, medications, and discharge instructions  Description: Complete learning assessment and assess knowledge base   Interventions:  - Provide teaching at level of understanding  - Provide teaching via preferred learning methods  Outcome: Progressing     Problem: DISCHARGE PLANNING  Goal: Discharge to home or other facility with appropriate resources  Description: INTERVENTIONS:  - Identify barriers to discharge w/patient and caregiver  - Arrange for needed discharge resources and transportation as appropriate  - Identify discharge learning needs (meds, wound care, etc )  - Arrange for interpretive services to assist at discharge as needed  - Refer to Case Management Department for coordinating discharge planning if the patient needs post-hospital services based on physician/advanced practitioner order or complex needs related to functional status, cognitive ability, or social support system  Outcome: Progressing

## 2023-02-15 NOTE — LACTATION NOTE
This note was copied from a baby's chart  CONSULT - LACTATION  Baby Boy Guanako Lomas) Mary 1 days male MRN: [id number]    [address] [facility] NURSERY Room / Bed: L&D 304(N)/L&D 304(n) Encounter: [id number]    Maternal Information     MOTHER:  Arielle Carty  Maternal Age: 33 y o    OB History: # 1 - Date: 23, Sex: Male, Weight: 2955 g (6 lb 8 2 oz), GA: 38w4d, Delivery: Vaginal, Spontaneous, Apgar1: 7, Apgar5: 9, Living: Living, Birth Comments: None   Previouse breast reduction surgery? No    Lactation history:   Has patient previously breast fed: No   How long had patient previously breast fed:     Previous breast feeding complications:       Past Surgical History:   Procedure Laterality Date   • TONSILLECTOMY          Birth information:  YOB: 2022   Time of birth: 6:44 AM   Sex: male   Delivery type: Vaginal, Spontaneous   Birth Weight: 2955 g (6 lb 8 2 oz)   Percent of Weight Change: -3%     Gestational Age: 38w3d   [unfilled]    Assessment     Breast and nipple assessment: sore nipple     Assessment: normal assessment    Feeding assessment: feeding well with guidance    LATCH:  Latch: Grasps breast, tongue down, lips flanged, rhythmic sucking   Audible Swallowing: Spontaneous and intermittent (24 hours old)   Type of Nipple: Everted (After stimulation)   Comfort (Breast/Nipple): Filling, red/small blisters/bruises, mild/moderate discomfort   Hold (Positioning): Partial assist, teach one side, mother does other, staff holds   DEPAU CENTER Score: 6          Feeding recommendations:  breast feed on demand     Admits to sore nipples  Information given about sore nipples and how to correct with positioning techniques  Discussed maneuvers to latch infant on properly to avoid nipple pain and promote healing  Discussed treatments that could be utilized to promote healing   Hydro gel dressings given with instruction and verbalization of understanding of cleaning and duration of use  Encouraged to call for latch assist     Verbal review of good latch/feed earlier  Parents called in now for assistance with positioning and latch  Mom chose right breast  I encouraged football due to hand dominance on this side  Worked on positioning infant up at chest level and starting to feed infant with nose arriving at the nipple  Then, using areolar compression to achieve a deep latch that is comfortable and exchanges optimum amounts of milk  Guided baby to deep latch  Stimulated to suck, converting to rocker suckling with breast softening till baby slid down  Burp attempt  Then Mom wanted to try football hold on left breast also due to nipple soreness on that side  Guided to deep latch  Stimulated to suck converting to rocker suckling with breast softening till popped off with relaxed tone  Mom notes less pain after latch on tenderness  No nipple compression and better suckling  Dad remains supportive at bedside  Also reviewed discharge breastfeeding booklet including the feeding log  Emphasized 8 or more (12) feedings in a 24 hour period, what to expect for the number of diapers per day of life and the progression of properties of the  stooling pattern  Reviewed breastfeeding and your lifestyle, storage and preparation of breast milk, how to keep you breast pump clean, the employed breastfeeding mother and paced bottle feeding handouts  Booklet included Breastfeeding Resources for after discharge including access to the number for the [address]  Encouraged parents to call for assistance, questions, and concerns about breastfeeding  Extension provided            Axel Davidson RN 2/15/2023 3:10 PM

## 2023-02-15 NOTE — PROGRESS NOTES
Progress Note - OB/GYN  Bambi Wheeler 33 y o  female MRN: [id number]  Unit/Bed#: L&D 304-01 Encounter: [id number]    Assessment and Plan     Bambi Wheeler is a patient of: [facility]  She is PPD# 1 s/p Spontaneous vaginal delivery and repair of second degree perineal laceration  Recovering well and is stable         *  (spontaneous vaginal delivery)  Assessment & Plan  Recovering well   Encourage Ambulation  Encourage breastfeeding  Tylenol/Motrin for pain   Rh +, rhogam not indicated   Undecided about discharge home today vs tomorrow       Disposition    - Anticipate discharge home on POD# 1-2      Subjective/Objective     Chief Complaint: Postpartum State     Subjective:    Bambi Wheeler is PPD#1 s/p Spontaneous vaginal delivery  She has no current complaints  Pain is well controlled  Patient is currently voiding  She is ambulating  Patient is currently passing flatus and has had no bowel movement  She is tolerating PO, and denies nausea or vomitting  Patient denies fever, chills, chest pain, shortness of breath, or calf tenderness  Lochia is normal  She is  Breastfeeding  She is recovering well and is stable         Vitals:   /70 (BP Location: Right arm)   Pulse 66   Temp 98 1 °F (36 7 °C) (Temporal)   Resp 18   Ht 5' 6" (1 676 m)   Wt 72 6 kg (160 lb)   LMP 2022   SpO2 99%   Breastfeeding Yes   BMI 25 82 kg/m²       Intake/Output Summary (Last 24 hours) at 2/15/2023 0556  Last data filed at 2023 2205  Gross per 24 hour   Intake --   Output 1950 ml   Net -1950 ml       Invasive Devices     Peripheral Intravenous Line  Duration           Peripheral IV 23 Left Hand 1 day                Physical Exam:   GEN: Bambi Wheeler appears well, alert, pleasant and cooperative   CARDIO: Regular rate  RESP:  Normal effort   ABDOMEN: soft, no tenderness, no distention, fundus @ umbillicus  EXTREMITIES: non-tender, no erythema       Labs:     Hemoglobin   Date Value Ref Range Status   02/14/2023 12 3 11 5 - 15 4 g/dL Final     External Hemoglobin   Date Value Ref Range Status   11/19/2022 12 0 g/dL Final   07/12/2022 14 5 g/dL Final     WBC   Date Value Ref Range Status   02/14/2023 13 69 (H) 4 31 - 10 16 Thousand/uL Final     Platelets   Date Value Ref Range Status   02/14/2023 161 149 - 390 Thousands/uL Final     External Platelets   Date Value Ref Range Status   11/19/2022 236 K/µL Final          Marlene Mckeon MD  2/15/2023  5:56 AM

## 2023-02-16 VITALS
BODY MASS INDEX: 25.71 KG/M2 | RESPIRATION RATE: 18 BRPM | SYSTOLIC BLOOD PRESSURE: 120 MMHG | TEMPERATURE: 98 F | HEIGHT: 66 IN | OXYGEN SATURATION: 99 % | DIASTOLIC BLOOD PRESSURE: 87 MMHG | HEART RATE: 79 BPM | WEIGHT: 160 LBS

## 2023-02-16 PROBLEM — Z3A.38 38 WEEKS GESTATION OF PREGNANCY: Status: RESOLVED | Noted: 2023-02-14 | Resolved: 2023-02-16

## 2023-02-16 RX ORDER — IBUPROFEN 200 MG
600 TABLET ORAL EVERY 6 HOURS SCHEDULED
Start: 2023-02-16

## 2023-02-16 RX ORDER — IBUPROFEN 600 MG/1
600 TABLET ORAL EVERY 6 HOURS SCHEDULED
Qty: 30 TABLET | Refills: 0
Start: 2023-02-16

## 2023-02-16 RX ORDER — ACETAMINOPHEN 325 MG/1
650 TABLET ORAL EVERY 6 HOURS SCHEDULED
Refills: 0
Start: 2023-02-16

## 2023-02-16 RX ADMIN — ACETAMINOPHEN 650 MG: 325 TABLET ORAL at 14:02

## 2023-02-16 RX ADMIN — IBUPROFEN 600 MG: 600 TABLET, FILM COATED ORAL at 06:19

## 2023-02-16 RX ADMIN — DOCUSATE SODIUM 100 MG: 100 CAPSULE, LIQUID FILLED ORAL at 07:47

## 2023-02-16 RX ADMIN — IBUPROFEN 600 MG: 600 TABLET, FILM COATED ORAL at 12:08

## 2023-02-16 RX ADMIN — BENZOCAINE AND LEVOMENTHOL 1 APPLICATION.: 200; 5 SPRAY TOPICAL at 07:46

## 2023-02-16 RX ADMIN — ACETAMINOPHEN 650 MG: 325 TABLET ORAL at 07:46

## 2023-02-16 RX ADMIN — ACETAMINOPHEN 650 MG: 325 TABLET ORAL at 01:59

## 2023-02-16 NOTE — PROGRESS NOTES
Progress Note - OB/GYN  Jonh Gunderson 33 y o  female MRN: [id number]  Unit/Bed#: L&D 304-01 Encounter: [id number]    Assessment and Plan     Jonh Gunderson is a patient of: [facility]  She is PPD# 2 s/p  spontaneous vaginal delivery  Recovering well and is stable       *  (spontaneous vaginal delivery)  Assessment & Plan  Recovering well   Encourage Ambulation  Encourage breastfeeding  Tylenol/Motrin for pain   Rh +, rhogam not indicated   Undecided about discharge home today vs tomorrow     38 weeks gestation of pregnancy-resolved as of 2023  Assessment & Plan  38w4d  Intact  GBS neg  Expectant management      Disposition    - Anticipate discharge home on PPD# 2      Subjective/Objective     Chief Complaint: Postpartum State     Subjective:    Jonh Gunderson is PPD#2 s/p  spontaneous vaginal delivery  She has no current complaints  Pain is well controlled  Patient is currently voiding  She is ambulating  Patient is currently passing flatus and has had bowel movement  She is tolerating PO, and denies nausea or vomitting  Patient denies fever, chills, chest pain, shortness of breath, or calf tenderness  Lochia is normal  She is  Breastfeeding  She is recovering well and is stable         Vitals:   /80 (BP Location: Right arm)   Pulse 71   Temp 98 4 °F (36 9 °C) (Temporal)   Resp 18   Ht 5' 6" (1 676 m)   Wt 72 6 kg (160 lb)   LMP 2022   SpO2 99%   Breastfeeding Yes   BMI 25 82 kg/m²     No intake or output data in the 24 hours ending 23 0642    Invasive Devices     None                 Physical Exam:   GEN: Jonh Gunderson appears well, alert and oriented x 3, pleasant and cooperative   CARDIO: RRR, no murmurs or rubs  RESP:  CTAB, no wheezes or rales  ABDOMEN: soft, no tenderness, no distention, fundus @ U-2  EXTREMITIES: non tender, no erythema  Labs:     Hemoglobin   Date Value Ref Range Status   2023 12 3 11 5 - 15 4 g/dL Final     External Hemoglobin Date Value Ref Range Status   11/19/2022 12 0 g/dL Final   07/12/2022 14 5 g/dL Final     WBC   Date Value Ref Range Status   02/14/2023 13 69 (H) 4 31 - 10 16 Thousand/uL Final     Platelets   Date Value Ref Range Status   02/14/2023 161 149 - 390 Thousands/uL Final     External Platelets   Date Value Ref Range Status   11/19/2022 236 K/µL Final          Mina Esparza MD  2/16/2023  6:42 AM

## 2023-02-16 NOTE — LACTATION NOTE
This note was copied from a baby's chart  CONSULT - LACTATION  Baby Boy Lala Hernandez 2 days male MRN: [id number]    [address] [facility] NURSERY Room / Bed: L&D 304(N)/L&D 304(n) Encounter: [id number]    Maternal Information     MOTHER:  Vignesh Ng  Maternal Age: 33 y o    OB History: # 1 - Date: 23, Sex: Male, Weight: 2955 g (6 lb 8 2 oz), GA: 38w4d, Delivery: Vaginal, Spontaneous, Apgar1: 7, Apgar5: 9, Living: Living, Birth Comments: None   Previouse breast reduction surgery? No    Lactation history:   Has patient previously breast fed: No   How long had patient previously breast fed:     Previous breast feeding complications:       Past Surgical History:   Procedure Laterality Date   • TONSILLECTOMY  2014        Birth information:  YOB: 2022   Time of birth: 6:44 AM   Sex: male   Delivery type: Vaginal, Spontaneous   Birth Weight: 2955 g (6 lb 8 2 oz)   Percent of Weight Change: -5%     Gestational Age: 38w3d   [unfilled]    Assessment     Breast and nipple assessment: sore nipple    [place] Assessment: normal assessment    Feeding assessment: feeding well  LATCH:  Latch: Grasps breast, tongue down, lips flanged, rhythmic sucking   Audible Swallowing: Spontaneous and intermittent (24 hours old)   Type of Nipple: Everted (After stimulation)   Comfort (Breast/Nipple): Soft/non-tender   Hold (Positioning): No assist from staff, mother able to position/hold infant   LATCH Score: 10         Having latch problems?  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Football   Breasts/Nipples   Left Breast Filling   Right Breast Filling   Left Nipple Everted;Tender   Right Nipple Everted;Tender   Intervention Lanolin   Breastfeeding Progress Breastfeeding well   Other OB Lactation Tools   Feeding Devices Lanolin   Patient Follow-Up   Lactation Consult Status 2   Follow-Up Type Inpatient;Call as needed   Other OB Lactation Documentation    Additional Problem Noted Deepali Braden is appearing more confident with feeding her baby  She is able to identify latching qualities of comfort and swallowing  Her breasts are filling  Reviewed D/C booklet with family an additional time at their request          Feeding recommendations:  breast feed on demand     Met with mother to go over discharge breastfeeding booklet including the feeding log  Emphasized 8 or more (12) feedings in a 24 hour period, what to expect for the number of diapers per day of life and the progression of properties of the  stooling pattern  Reviewed breastfeeding and your lifestyle, storage and preparation of breast milk, how to keep you breast pump clean, the employed breastfeeding mother and paced bottle feeding handouts  Booklet included Breastfeeding Resources for after discharge including access to the number for the [address]  Discussed s/s engorgement, blocked milk ducts, and mastitis  Discussed how to remedy at home and when to contact physician  Encouraged parents to call for assistance, questions, and concerns about breastfeeding  Extension provided        Miguel Chaney RN 2023 11:30 AM

## 2023-02-16 NOTE — PLAN OF CARE
Problem: PAIN - ADULT  Goal: Verbalizes/displays adequate comfort level or baseline comfort level  Description: Interventions:  - Encourage patient to monitor pain and request assistance  - Assess pain using appropriate pain scale  - Administer analgesics based on type and severity of pain and evaluate response  - Implement non-pharmacological measures as appropriate and evaluate response  - Consider cultural and social influences on pain and pain management  - Notify physician/advanced practitioner if interventions unsuccessful or patient reports new pain  Outcome: Adequate for Discharge     Problem: INFECTION - ADULT  Goal: Absence or prevention of progression during hospitalization  Description: INTERVENTIONS:  - Assess and monitor for signs and symptoms of infection  - Monitor lab/diagnostic results  - Monitor all insertion sites, i e  indwelling lines, tubes, and drains  - Monitor endotracheal if appropriate and nasal secretions for changes in amount and color  - Percival appropriate cooling/warming therapies per order  - Administer medications as ordered  - Instruct and encourage patient and family to use good hand hygiene technique  - Identify and instruct in appropriate isolation precautions for identified infection/condition  Outcome: Adequate for Discharge  Goal: Absence of fever/infection during neutropenic period  Description: INTERVENTIONS:  - Monitor WBC    Outcome: Adequate for Discharge     Problem: SAFETY ADULT  Goal: Patient will remain free of falls  Description: INTERVENTIONS:  - Educate patient/family on patient safety including physical limitations  - Instruct patient to call for assistance with activity   - Consult OT/PT to assist with strengthening/mobility   - Keep Call bell within reach  - Keep bed low and locked with side rails adjusted as appropriate  - Keep care items and personal belongings within reach  - Initiate and maintain comfort rounds  - Make Fall Risk Sign visible to staff  - Apply yellow socks and bracelet for high fall risk patients  - Consider moving patient to room near nurses station  Outcome: Adequate for Discharge  Goal: Maintain or return to baseline ADL function  Description: INTERVENTIONS:  -  Assess patient's ability to carry out ADLs; assess patient's baseline for ADL function and identify physical deficits which impact ability to perform ADLs (bathing, care of mouth/teeth, toileting, grooming, dressing, etc )  - Assess/evaluate cause of self-care deficits   - Assess range of motion  - Assess patient's mobility; develop plan if impaired  - Assess patient's need for assistive devices and provide as appropriate  - Encourage maximum independence but intervene and supervise when necessary  - Involve family in performance of ADLs  - Assess for home care needs following discharge   - Consider OT consult to assist with ADL evaluation and planning for discharge  - Provide patient education as appropriate  Outcome: Adequate for Discharge  Goal: Maintains/Returns to pre admission functional level  Description: INTERVENTIONS:  - Perform BMAT or MOVE assessment daily    - Set and communicate daily mobility goal to care team and patient/family/caregiver  - Collaborate with rehabilitation services on mobility goals if consulted  - Out of bed for toileting  - Record patient progress and toleration of activity level   Outcome: Adequate for Discharge     Problem: Knowledge Deficit  Goal: Patient/family/caregiver demonstrates understanding of disease process, treatment plan, medications, and discharge instructions  Description: Complete learning assessment and assess knowledge base    Interventions:  - Provide teaching at level of understanding  - Provide teaching via preferred learning methods  Outcome: Adequate for Discharge     Problem: DISCHARGE PLANNING  Goal: Discharge to home or other facility with appropriate resources  Description: INTERVENTIONS:  - Identify barriers to discharge w/patient and caregiver  - Arrange for needed discharge resources and transportation as appropriate  - Identify discharge learning needs (meds, wound care, etc )  - Arrange for interpretive services to assist at discharge as needed  - Refer to Case Management Department for coordinating discharge planning if the patient needs post-hospital services based on physician/advanced practitioner order or complex needs related to functional status, cognitive ability, or social support system  Outcome: Adequate for Discharge     Problem: POSTPARTUM  Goal: Experiences normal postpartum course  Description: INTERVENTIONS:  - Monitor maternal vital signs  - Assess uterine involution and lochia  Outcome: Adequate for Discharge  Goal: Appropriate maternal -  bonding  Description: INTERVENTIONS:  - Identify family support  - Assess for appropriate maternal/infant bonding   -Encourage maternal/infant bonding opportunities  - Referral to  or  as needed  Outcome: Adequate for Discharge  Goal: Establishment of infant feeding pattern  Description: INTERVENTIONS:  - Assess breast/bottle feeding  - Refer to lactation as needed  Outcome: Adequate for Discharge  Goal: Incision(s), wounds(s) or drain site(s) healing without S/S of infection  Description: INTERVENTIONS  - Assess and document dressing, incision, wound bed, drain sites and surrounding tissue  - Provide patient and family education    Outcome: Adequate for Discharge

## 2023-02-17 ENCOUNTER — HOSPITAL ENCOUNTER (EMERGENCY)
Facility: HOSPITAL | Age: 28
Discharge: HOME | End: 2023-02-17
Attending: EMERGENCY MEDICINE

## 2023-02-17 ENCOUNTER — APPOINTMENT (EMERGENCY)
Dept: CT IMAGING | Facility: HOSPITAL | Age: 28
End: 2023-02-17

## 2023-02-17 VITALS
OXYGEN SATURATION: 99 % | TEMPERATURE: 97.7 F | DIASTOLIC BLOOD PRESSURE: 96 MMHG | SYSTOLIC BLOOD PRESSURE: 138 MMHG | RESPIRATION RATE: 16 BRPM | HEART RATE: 83 BPM

## 2023-02-17 DIAGNOSIS — R07.9 CHEST PAIN: ICD-10-CM

## 2023-02-17 LAB
ALBUMIN SERPL BCP-MCNC: 3.6 G/DL (ref 3.5–5)
ALP SERPL-CCNC: 102 U/L (ref 34–104)
ALT SERPL W P-5'-P-CCNC: 34 U/L (ref 7–52)
ANION GAP SERPL CALCULATED.3IONS-SCNC: 5 MMOL/L (ref 4–13)
AST SERPL W P-5'-P-CCNC: 47 U/L (ref 13–39)
ATRIAL RATE: 74 BPM
BACTERIA UR QL AUTO: ABNORMAL /HPF
BASOPHILS # BLD AUTO: 0.03 THOUSANDS/ÂΜL (ref 0–0.1)
BASOPHILS NFR BLD AUTO: 0 % (ref 0–1)
BILIRUB SERPL-MCNC: 0.36 MG/DL (ref 0.2–1)
BILIRUB UR QL STRIP: NEGATIVE
BNP SERPL-MCNC: 183 PG/ML (ref 0–100)
BUN SERPL-MCNC: 13 MG/DL (ref 5–25)
CALCIUM SERPL-MCNC: 9.1 MG/DL (ref 8.4–10.2)
CARDIAC TROPONIN I PNL SERPL HS: 3 NG/L
CHLORIDE SERPL-SCNC: 109 MMOL/L (ref 96–108)
CLARITY UR: CLEAR
CO2 SERPL-SCNC: 25 MMOL/L (ref 21–32)
COLOR UR: YELLOW
CREAT SERPL-MCNC: 0.6 MG/DL (ref 0.6–1.3)
CREAT UR-MCNC: 54 MG/DL
D DIMER PPP FEU-MCNC: 7.65 UG/ML FEU
EOSINOPHIL # BLD AUTO: 0.07 THOUSAND/ÂΜL (ref 0–0.61)
EOSINOPHIL NFR BLD AUTO: 1 % (ref 0–6)
ERYTHROCYTE [DISTWIDTH] IN BLOOD BY AUTOMATED COUNT: 13.9 % (ref 11.6–15.1)
GFR SERPL CREATININE-BSD FRML MDRD: 126 ML/MIN/1.73SQ M
GLUCOSE SERPL-MCNC: 72 MG/DL (ref 65–140)
GLUCOSE UR STRIP-MCNC: NEGATIVE MG/DL
HCT VFR BLD AUTO: 36.8 % (ref 34.8–46.1)
HGB BLD-MCNC: 12 G/DL (ref 11.5–15.4)
HGB UR QL STRIP.AUTO: ABNORMAL
IMM GRANULOCYTES # BLD AUTO: 0.05 THOUSAND/UL (ref 0–0.2)
IMM GRANULOCYTES NFR BLD AUTO: 1 % (ref 0–2)
KETONES UR STRIP-MCNC: NEGATIVE MG/DL
LEUKOCYTE ESTERASE UR QL STRIP: ABNORMAL
LYMPHOCYTES # BLD AUTO: 1.51 THOUSANDS/ÂΜL (ref 0.6–4.47)
LYMPHOCYTES NFR BLD AUTO: 17 % (ref 14–44)
MCH RBC QN AUTO: 29.5 PG (ref 26.8–34.3)
MCHC RBC AUTO-ENTMCNC: 32.6 G/DL (ref 31.4–37.4)
MCV RBC AUTO: 90 FL (ref 82–98)
MONOCYTES # BLD AUTO: 0.65 THOUSAND/ÂΜL (ref 0.17–1.22)
MONOCYTES NFR BLD AUTO: 7 % (ref 4–12)
MUCOUS THREADS UR QL AUTO: ABNORMAL
NEUTROPHILS # BLD AUTO: 6.57 THOUSANDS/ÂΜL (ref 1.85–7.62)
NEUTS SEG NFR BLD AUTO: 74 % (ref 43–75)
NITRITE UR QL STRIP: NEGATIVE
NON-SQ EPI CELLS URNS QL MICRO: ABNORMAL /HPF
NRBC BLD AUTO-RTO: 0 /100 WBCS
P AXIS: 59 DEGREES
PH UR STRIP.AUTO: 6.5 [PH]
PLATELET # BLD AUTO: 165 THOUSANDS/UL (ref 149–390)
PMV BLD AUTO: 10.8 FL (ref 8.9–12.7)
POTASSIUM SERPL-SCNC: 4 MMOL/L (ref 3.5–5.3)
PR INTERVAL: 122 MS
PROT SERPL-MCNC: 6.5 G/DL (ref 6.4–8.4)
PROT UR STRIP-MCNC: NEGATIVE MG/DL
PROT UR-MCNC: 9 MG/DL
PROT/CREAT UR: 0.17 MG/G{CREAT} (ref 0–0.1)
QRS AXIS: 66 DEGREES
QRSD INTERVAL: 86 MS
QT INTERVAL: 368 MS
QTC INTERVAL: 408 MS
RBC # BLD AUTO: 4.07 MILLION/UL (ref 3.81–5.12)
RBC #/AREA URNS AUTO: ABNORMAL /HPF
SODIUM SERPL-SCNC: 139 MMOL/L (ref 135–147)
SP GR UR STRIP.AUTO: 1.01 (ref 1–1.03)
T WAVE AXIS: 62 DEGREES
UROBILINOGEN UR QL STRIP.AUTO: 0.2 E.U./DL
VENTRICULAR RATE: 74 BPM
WBC # BLD AUTO: 8.88 THOUSAND/UL (ref 4.31–10.16)
WBC #/AREA URNS AUTO: ABNORMAL /HPF

## 2023-02-17 RX ORDER — ACETAMINOPHEN 325 MG/1
975 TABLET ORAL EVERY 6 HOURS PRN
Status: DISCONTINUED | OUTPATIENT
Start: 2023-02-17 | End: 2023-02-17 | Stop reason: HOSPADM

## 2023-02-17 RX ORDER — IBUPROFEN 600 MG/1
600 TABLET ORAL EVERY 6 HOURS PRN
Status: DISCONTINUED | OUTPATIENT
Start: 2023-02-17 | End: 2023-02-17 | Stop reason: HOSPADM

## 2023-02-17 RX ADMIN — SODIUM CHLORIDE 1000 ML: 0.9 INJECTION, SOLUTION INTRAVENOUS at 10:45

## 2023-02-17 RX ADMIN — ACETAMINOPHEN 650 MG: 325 TABLET ORAL at 13:31

## 2023-02-17 RX ADMIN — IBUPROFEN 600 MG: 600 TABLET, FILM COATED ORAL at 13:31

## 2023-02-17 RX ADMIN — IOHEXOL 85 ML: 350 INJECTION, SOLUTION INTRAVENOUS at 11:49

## 2023-02-17 NOTE — CONSULTS
Consult - OB/GYN   [facility] 33 [facility]: [id number]  Unit/Bed#: ED-14 Encounter: [id number]    Assessment:   33 y o  Michael Montes PPD#3 from a uncomplicated spontaneous vaginal delivery now presenting with chest pain    Plan:   * Chest pain  Assessment & Plan  CBC and CMP wnl; P/c ratio 0 17  Trops and EKG wnl    CT PE study: negative  Given Pre-eclampsia precautions  Pt will call OB if symptoms return/worsen  She will be seen in the office next week for BP check  Discussed case and plan w/ Dr Ceci Hernandez    HPI:  She presents with substernal chest pain that started last night  She was discharged from the hospital yesterday  She reports that the pain is substernal and very sharp in nature and constant  It does not radiate anywhere  She does not have any associated shortness of breath, palpitations or tachycardia  She reports she took her blood pressure at home and it was elevated  She reports a very minor headache from stress and lack of sleep  She denies vision changes, RUQ pain, or calf tenderness  Active Problems:  Patient Active Problem List   Diagnosis   •  (spontaneous vaginal delivery)   • Chest pain       PMH:  History reviewed  No pertinent past medical history      PSH:  Past Surgical History:   Procedure Laterality Date   • TONSILLECTOMY         OB History  OB History    Para Term  AB Living   1 1 1     1   SAB IAB Ectopic Multiple Live Births         0 1      # Outcome Date GA Lbr Jeronimo/2nd Weight Sex Delivery Anes PTL Lv   1 Term 23 38w4d / 00:48 2955 g (6 lb 8 2 oz) M Vag-Spont EPI  WISAM       Social Hx:  -/-/-    Meds:  Current Facility-Administered Medications on File Prior to Encounter   Medication Dose Route Frequency Provider Last Rate Last Admin   • [DISCONTINUED] acetaminophen (TYLENOL) tablet 650 mg  650 mg Oral Q6H [address] Ajit Caballero MD   650 mg at 23 1402   • [DISCONTINUED] benzocaine-menthol-lanolin-aloe (DERMOPLAST) 20-0 5 % topical spray 1 application  1 application Topical Y2H PRN Polly Alvarez MD   1 application at 56/06/19 7130   • [DISCONTINUED] calcium carbonate (TUMS) chewable tablet 1,000 mg  1,000 mg Oral Daily PRN Polly Alvarez MD       • [DISCONTINUED] diphenhydrAMINE (BENADRYL) tablet 25 mg  25 mg Oral Q6H PRN Polly Alvarez MD       • [DISCONTINUED] docusate sodium (COLACE) capsule 100 mg  100 mg Oral BID Polly Alvarez MD   100 mg at 02/16/23 0747   • [DISCONTINUED] hydrocortisone 1 % cream 1 application  1 application Topical Daily PRN Polly Alvarez MD       • [DISCONTINUED] ibuprofen (MOTRIN) tablet 600 mg  600 mg Oral Q6H [address] MD   600 mg at 02/16/23 1208   • [DISCONTINUED] ondansetron (ZOFRAN) injection 4 mg  4 mg Intravenous Q8H PRN Polly Alvarez MD       • [DISCONTINUED] simethicone (MYLICON) chewable tablet 80 mg  80 mg Oral 4x Daily PRN Polly Alvarez MD       • [DISCONTINUED] witch hazel-glycerin (TUCKS) topical pad 1 pad  1 pad Topical Q4H PRN Polly Alvarez MD   1 pad at 02/14/23 7798     Current Outpatient Medications on File Prior to Encounter   Medication Sig Dispense Refill   • acetaminophen (TYLENOL) 325 mg tablet Take 2 tablets (650 mg total) by mouth every 6 (six) hours  0   • ibuprofen (MOTRIN) 200 mg tablet Take 3 tablets (600 mg total) by mouth every 6 (six) hours     • Prenatal MV-Min-Fe Fum-FA-DHA (PRENATAL 1 PO) Take by mouth         Allergies:  No Known Allergies    Physical Exam:  /87 (BP Location: Left arm)   Pulse 72   Temp 97 7 °F (36 5 °C) (Oral)   Resp 18   LMP 05/20/2022   SpO2 98%   Breastfeeding Yes     Physical Exam  Vitals reviewed  Constitutional:       Appearance: Normal appearance  Cardiovascular:      Rate and Rhythm: Normal rate and regular rhythm  Pulmonary:      Effort: Pulmonary effort is normal       Breath sounds: Normal breath sounds     Chest: Chest wall: No tenderness  Abdominal:      Palpations: Abdomen is soft  Tenderness: There is no abdominal tenderness  Musculoskeletal:         General: No swelling or tenderness  Skin:     General: Skin is warm and dry  Neurological:      Mental Status: She is alert and oriented to person, place, and time

## 2023-02-17 NOTE — ED NOTES
Pt states she and spouse are agreeable to CT scan  TT to ED provider re: same       Leeann Tapia  02/17/23 1012

## 2023-02-17 NOTE — ED PROVIDER NOTES
History  Chief Complaint   Patient presents with   • Chest Pain     Pt c/o midsternal stabbing chest pain starting last night  Pt just gave birth on 2/14  No complications with delivery  Pt states her BP has been high for her with systolics in the 694J  81-year-old female with no past medical history presents to the ED complaining of chest pain that started last evening  Patient had spontaneous vaginal delivery on 2/14  She has had no complications during this pregnancy  This was her first pregnancy  No complications at delivery  She states she was discharged yesterday and in the evening started to develop midsternal chest pressure  She states the pain seems worse if she leans backwards and seems a little bit better leaning forward  No radiation of the pain  No shortness of breath or diaphoresis  No extremity swelling or facial swelling  She also has noticed her blood pressure elevated at home since last evening  She states it was normal throughout the pregnancy and normal yesterday at discharge  She states her blood pressure was as high as 168/110 at home        History provided by:  Patient   used: No    Chest Pain  Pain location:  Substernal area  Pain quality: pressure    Pain radiates to:  Does not radiate  Pain radiates to the back: no    Onset quality:  Gradual  Duration:  12 hours  Timing:  Constant  Progression:  Waxing and waning  Chronicity:  New  Context: movement and at rest    Relieved by:  Nothing  Worsened by:  Certain positions  Ineffective treatments:  None tried  Associated symptoms: no abdominal pain, no altered mental status, no anorexia, no anxiety, no back pain, no claudication, no cough, no diaphoresis, no dizziness, no dysphagia, no fatigue, no fever, no headache, no heartburn, no lower extremity edema, no nausea, no near-syncope, no numbness, no orthopnea, no palpitations, no PND, no shortness of breath, no syncope, not vomiting and no weakness    Risk factors: no coronary artery disease, no diabetes mellitus, no high cholesterol, no hypertension, no immobilization, not obese, no prior DVT/PE, no smoking and no surgery        Prior to Admission Medications   Prescriptions Last Dose Informant Patient Reported? Taking? Prenatal MV-Min-Fe Fum-FA-DHA (PRENATAL 1 PO)   Yes No   Sig: Take by mouth   acetaminophen (TYLENOL) 325 mg tablet   No Yes   Sig: Take 2 tablets (650 mg total) by mouth every 6 (six) hours   ibuprofen (MOTRIN) 200 mg tablet   No Yes   Sig: Take 3 tablets (600 mg total) by mouth every 6 (six) hours      Facility-Administered Medications: None       History reviewed  No pertinent past medical history  Past Surgical History:   Procedure Laterality Date   • TONSILLECTOMY  2014       History reviewed  No pertinent family history  I have reviewed and agree with the history as documented  E-Cigarette/Vaping   • E-Cigarette Use Never User      E-Cigarette/Vaping Substances   • Nicotine No    • THC No    • CBD No    • Flavoring No    • Other No    • Unknown No      Social History     Tobacco Use   • Smoking status: Never   • Smokeless tobacco: Never   Vaping Use   • Vaping Use: Never used   Substance Use Topics   • Alcohol use: Not Currently   • Drug use: Never       Review of Systems   Constitutional: Negative  Negative for chills, diaphoresis, fatigue and fever  HENT: Negative  Negative for congestion, rhinorrhea, sore throat and trouble swallowing  Eyes: Negative  Negative for discharge, redness and itching  Respiratory: Negative  Negative for apnea, cough, chest tightness, shortness of breath and wheezing  Cardiovascular: Positive for chest pain  Negative for palpitations, orthopnea, claudication, leg swelling, syncope, PND and near-syncope  Gastrointestinal: Negative  Negative for abdominal pain, anorexia, heartburn, nausea and vomiting  Endocrine: Negative  Genitourinary: Negative    Negative for flank pain, frequency and urgency  Musculoskeletal: Negative  Negative for back pain  Skin: Negative  Allergic/Immunologic: Negative  Neurological: Negative  Negative for dizziness, tremors, seizures, syncope, facial asymmetry, speech difficulty, weakness, light-headedness, numbness and headaches  Hematological: Negative  All other systems reviewed and are negative  Physical Exam  Physical Exam  Vitals and nursing note reviewed  Constitutional:       General: She is awake  She is not in acute distress  Appearance: Normal appearance  She is well-developed and normal weight  She is not ill-appearing, toxic-appearing or diaphoretic  HENT:      Head: Normocephalic and atraumatic  Right Ear: External ear normal       Left Ear: External ear normal       Nose: Nose normal       Mouth/Throat:      Pharynx: No oropharyngeal exudate  Eyes:      General:         Right eye: No discharge  Left eye: No discharge  Conjunctiva/sclera: Conjunctivae normal       Pupils: Pupils are equal, round, and reactive to light  Neck:      Thyroid: No thyromegaly  Vascular: No JVD  Trachea: No tracheal deviation  Cardiovascular:      Rate and Rhythm: Normal rate and regular rhythm  Heart sounds: Normal heart sounds  No murmur heard  No friction rub  No gallop  Pulmonary:      Effort: Pulmonary effort is normal  No respiratory distress  Breath sounds: Normal breath sounds  No stridor  No wheezing, rhonchi or rales  Chest:      Chest wall: No tenderness  Abdominal:      General: Bowel sounds are normal  There is no distension  Palpations: Abdomen is soft  There is no mass  Tenderness: There is no abdominal tenderness  Hernia: No hernia is present  Musculoskeletal:         General: No tenderness or deformity  Normal range of motion  Cervical back: Normal range of motion and neck supple  Right lower leg: No edema  Left lower leg: No edema     Lymphadenopathy: Cervical: No cervical adenopathy  Skin:     General: Skin is warm and dry  Coloration: Skin is not jaundiced or pale  Findings: No bruising, erythema, lesion or rash  Neurological:      General: No focal deficit present  Mental Status: She is alert and oriented to person, place, and time  Motor: No weakness or abnormal muscle tone  Deep Tendon Reflexes: Reflexes are normal and symmetric  Psychiatric:         Mood and Affect: Mood normal          Behavior: Behavior is cooperative           Vital Signs  ED Triage Vitals   Temperature Pulse Respirations Blood Pressure SpO2   02/17/23 0652 02/17/23 0652 02/17/23 0652 02/17/23 0652 02/17/23 0652   97 7 °F (36 5 °C) 78 17 158/97 98 %      Temp Source Heart Rate Source Patient Position - Orthostatic VS BP Location FiO2 (%)   02/17/23 0652 02/17/23 0652 02/17/23 0652 02/17/23 0652 --   Oral Monitor Lying Left arm       Pain Score       02/17/23 0817       5           Vitals:    02/17/23 0652 02/17/23 0817 02/17/23 0920 02/17/23 1100   BP: 158/97 139/88 129/90 140/87   Pulse: 78 74 76 72   Patient Position - Orthostatic VS: Lying Lying Lying Lying         Visual Acuity      ED Medications  Medications   acetaminophen (TYLENOL) tablet 975 mg (has no administration in time range)   ibuprofen (MOTRIN) tablet 600 mg (has no administration in time range)   sodium chloride 0 9 % bolus 1,000 mL (1,000 mL Intravenous New Bag 2/17/23 1045)   iohexol (OMNIPAQUE) 350 MG/ML injection (SINGLE-DOSE) 85 mL (85 mL Intravenous Given 2/17/23 1149)       Diagnostic Studies  Results Reviewed     Procedure Component Value Units Date/Time    Urine Microscopic [[id number]]  (Abnormal) Collected: 02/17/23 1043    Lab Status: Final result Specimen: Urine, Clean Catch Updated: 02/17/23 1121     RBC, UA 1-2 /hpf      WBC, UA 4-10 /hpf      Epithelial Cells Occasional /hpf      Bacteria, UA Occasional /hpf      MUCUS THREADS Occasional    Protein / creatinine ratio, urine [[id number]]  (Abnormal) Collected: 02/17/23 1043    Lab Status: Final result Specimen: Urine, Catheter Updated: 02/17/23 1110     Creatinine, Ur 54 0 mg/dL      Protein Urine Random 9 mg/dL      Prot/Creat Ratio, Ur 0 17    UA w Reflex to Microscopic w Reflex to Culture [[id number]]  (Abnormal) Collected: 02/17/23 1043    Lab Status: Final result Specimen: Urine, Clean Catch Updated: 02/17/23 1108     Color, UA Yellow     Clarity, UA Clear     Specific Gravity, UA 1 015     pH, UA 6 5     Leukocytes, UA Small     Nitrite, UA Negative     Protein, UA Negative mg/dl      Glucose, UA Negative mg/dl      Ketones, UA Negative mg/dl      Urobilinogen, UA 0 2 E U /dl      Bilirubin, UA Negative     Occult Blood, UA Large    B-Type Natriuretic Peptide(BNP) [[id number]]  (Abnormal) Collected: 02/17/23 0757    Lab Status: Final result Specimen: Blood from Arm, Left Updated: 02/17/23 1108      pg/mL     D-Dimer [[id number]]  (Abnormal) Collected: 02/17/23 0757    Lab Status: Final result Specimen: Blood from Arm, Left Updated: 02/17/23 0921     D-Dimer, Quant 7 65 ug/ml FEU     HS Troponin 0hr (reflex protocol) [[id number]]  (Normal) Collected: 02/17/23 0757    Lab Status: Final result Specimen: Blood from Arm, Left Updated: 02/17/23 0830     hs TnI 0hr 3 ng/L     Comprehensive metabolic panel [[id number]]  (Abnormal) Collected: 02/17/23 0757    Lab Status: Final result Specimen: Blood from Arm, Left Updated: 02/17/23 6437     Sodium 139 mmol/L      Potassium 4 0 mmol/L      Chloride 109 mmol/L      CO2 25 mmol/L      ANION GAP 5 mmol/L      BUN 13 mg/dL      Creatinine 0 60 mg/dL      Glucose 72 mg/dL      Calcium 9 1 mg/dL      AST 47 U/L      ALT 34 U/L      Alkaline Phosphatase 102 U/L      Total Protein 6 5 g/dL      Albumin 3 6 g/dL      Total Bilirubin 0 36 mg/dL      eGFR 126 ml/min/1 73sq m     Narrative:      [place] guidelines for Chronic Kidney Disease (CKD):   •  Stage 1 with normal or high GFR (GFR > 90 mL/min/1 73 square meters)  •  Stage 2 Mild CKD (GFR = 60-89 mL/min/1 73 square meters)  •  Stage 3A Moderate CKD (GFR = 45-59 mL/min/1 73 square meters)  •  Stage 3B Moderate CKD (GFR = 30-44 mL/min/1 73 square meters)  •  Stage 4 Severe CKD (GFR = 15-29 mL/min/1 73 square meters)  •  Stage 5 End Stage CKD (GFR <15 mL/min/1 73 square meters)  Note: GFR calculation is accurate only with a steady state creatinine    CBC and differential [[id number]] Collected: 02/17/23 0757    Lab Status: Final result Specimen: Blood from Arm, Left Updated: 02/17/23 0809     WBC 8 88 Thousand/uL      RBC 4 07 Million/uL      Hemoglobin 12 0 g/dL      Hematocrit 36 8 %      MCV 90 fL      MCH 29 5 pg      MCHC 32 6 g/dL      RDW 13 9 %      MPV 10 8 fL      Platelets 168 Thousands/uL      nRBC 0 /100 WBCs      Neutrophils Relative 74 %      Immat GRANS % 1 %      Lymphocytes Relative 17 %      Monocytes Relative 7 %      Eosinophils Relative 1 %      Basophils Relative 0 %      Neutrophils Absolute 6 57 Thousands/µL      Immature Grans Absolute 0 05 Thousand/uL      Lymphocytes Absolute 1 51 Thousands/µL      Monocytes Absolute 0 65 Thousand/µL      Eosinophils Absolute 0 07 Thousand/µL      Basophils Absolute 0 03 Thousands/µL                  CTA ED chest PE study   Final Result by Paola Goddard MD (02/17 1300)      No acute pathology  No pulmonary embolus                    Workstation performed: [id number]                    Procedures  ECG 12 Lead Documentation Only    Date/Time: 2/17/2023 7:46 AM  Performed by: Glenna Alberto DO  Authorized by: Glenna Alberto DO     Indications / Diagnosis:  Cp  ECG reviewed by me, the ED Provider: yes    Interpretation:     Interpretation: normal    Rate:     ECG rate:  76    ECG rate assessment: normal    Rhythm:     Rhythm: sinus rhythm    Ectopy:     Ectopy: none    Conduction:     Conduction: normal    ST segments:     ST segments:  Normal  T waves:     T waves: normal               ED Course  ED Course as of 02/17/23 1327   Fri Feb 17, 202317, 2023 2028 D-Dimer, Quant(!): 7 65  Years criteria suggest PE not entirely excluded based on D-dimer  Had discussion with patient and patient's   Currently they are discussing whether or not to proceed with CT  HEART Risk Score    Flowsheet Row Most Recent Value   Heart Score Risk Calculator    History 0 Filed at: 02/17/2023 1259   ECG 0 Filed at: 02/17/2023 1259   Age 0 Filed at: 02/17/2023 1259   Risk Factors 0 Filed at: 02/17/2023 1259   Troponin 0 Filed at: 02/17/2023 1259   HEART Score 0 Filed at: 02/17/2023 1259                        SBIRT 22yo+    Flowsheet Row Most Recent Value   SBIRT (23 yo +)    In order to provide better care to our patients, we are screening all of our patients for alcohol and drug use  Would it be okay to ask you these screening questions? No Filed at: 02/17/2023 1655          Wells' Criteria for PE    Flowsheet Row Most Recent Value   Wells' Criteria for PE    Clinical signs and symptoms of DVT 0 Filed at: 02/17/2023 1017   PE is primary diagnosis or equally likely 3 Filed at: 02/17/2023 1017   HR >100 0 Filed at: 02/17/2023 1017   Immobilization at least 3 days or Surgery in the previous 4 weeks 0 Filed at: 02/17/2023 1017   Previous, objectively diagnosed PE or DVT 0 Filed at: 02/17/2023 1017   Hemoptysis 0 Filed at: 02/17/2023 1017   Malignancy with treatment within 6 months or palliative 0 Filed at: 02/17/2023 1017   Wells' Criteria Total 3 Filed at: 02/17/2023 1017                Medical Decision Making  43-year-old female presents to the ED with chest tightness that started last evening along with elevated blood pressures  Patient had spontaneous vaginal delivery 3/99 without complication  No complications during her pregnancy  This was her first pregnancy    She states at discharge her blood pressure was normal   Last evening she started with chest pressure without radiation  No shortness of breath  She noted that her blood pressure has been consistently elevated since last evening  She has had no headaches, visual complaints, facial lower extremity swelling  On exam she is alert no acute distress  Exam here is normal   Differential includes but is not limited to postpartum preeclampsia, postpartum hypertension, chest wall pain, PE, although this is less likely considering no tachypnea or tachycardia, postpartum cardiomyopathy  Will check cardiac work-up, add D-dimer  We will continue to monitor blood pressure  We will also add liver enzymes and urinalysis to rule out preeclampsia  Ultimately if work-up is negative we will contact OB/GYN to see if patient will need medication for blood pressure control  Amount and/or Complexity of Data Reviewed  Labs: ordered  Disposition  Final diagnoses:   Postpartum hypertension   Chest pain     Time reflects when diagnosis was documented in both MDM as applicable and the Disposition within this note     Time User Action Codes Description Comment    2/17/2023 11:09 AM Wenceslao SORIA Add [O16 5] Postpartum hypertension     2/17/2023  1:26 PM Wenceslao SORIA Add [R07 9] Chest pain       ED Disposition     ED Disposition   Discharge    Condition   Good    Date/Time   Fri Feb 17, 2023  1:26 PM    Comment   Jazzy Elizondo discharge to home/self care  Follow-up Information     Follow up With Specialties Details Why Contact Info        Follow-up with OB/GYN as directed          Patient's Medications   Discharge Prescriptions    No medications on file       No discharge procedures on file      PDMP Review     None          ED Provider  Electronically Signed by           Bryce Macario,   02/17/23 [address] [place] [address], DO  02/17/23 8974

## 2023-02-17 NOTE — ASSESSMENT & PLAN NOTE
CBC and CMP wnl; P/c ratio 0 17  Trops and EKG wnl    CT PE study: negative  Given Pre-eclampsia precautions  Pt will call OB if symptoms return/worsen  She will be seen in the office next week for BP check

## 2023-02-17 NOTE — UTILIZATION REVIEW
NOTIFICATION OF INPATIENT ADMISSION   MATERNITY/DELIVERY AUTHORIZATION REQUEST   SERVICING FACILITY:   [address] [place] [address] - L&D, , NICU  [address] [facility], [facility], [address] [facility]  Tax ID: [id number]  NPI: [id number] ATTENDING PROVIDER:  Attending Name and NPI#: [address] [place], [place] [[id number]]  Address: [address] [place] [address], [facility], [facility] [address] [facility]  Phone: [phone]     ADMISSION INFORMATION:  Place of Service: Inpatient [address] [facility] [[email or web address]]  [address]  Place of Service Code: 21  Inpatient Admission Date/Time: 23  1:01 AM  Discharge Date/Time: 2023  5:23 PM  Admitting Diagnosis Code/Description:  Abdominal pain during pregnancy in third trimester [O26 893, R10 9]  Vaginal bleeding in pregnancy, third trimester [O46 93]     Mother: Jonh [facility] 1995 Estimated Date of Delivery: 23  Delivering clinician: Nancy Vela    OB History        1    Para   1    Term   1            AB        Living   1       SAB        IAB        Ectopic        Multiple   0    Live Births   1                Name & MRN:   Information for the patient's :  Dee Dee Mccallum Isabel Sykes [[id number]]     [place] Delivery Information:  Sex: male  Delivered 2023 6:44 AM by Vaginal, Spontaneous; Gestational Age: 38w3d     Measurements:  Weight: 6 lb 8 2 oz (2955 g); Height: 20"    APGAR 1 minute 5 minutes 10 minutes   Totals: 7 9      [place] Birth Information: 33 y o  female MRN: [id number] Unit/Bed#: L&D 304-01   Birthweight: No birth weight on file  Gestational Age: <None> Delivery Type:    APGARS Totals:        UTILIZATION REVIEW CONTACT:  Cortes Rojo, Utilization   Network Utilization Review Department  Phone: [phone]  Fax [phone]  Email: [email or web address]  Contact for approvals/pending authorizations, clinical reviews, and discharge       PHYSICIAN ADVISORY SERVICES:  Medical Necessity Denial & [employer] Review  Phone: [phone]  Fax: [phone]  Email: [email or web address]

## 2023-02-17 NOTE — ED NOTES
Patient transported to [address] [place] [address], [address] [place] [address]  02/17/23 1687

## 2023-02-17 NOTE — ED NOTES
TT to provider that urine analyzer is not functioning and need order to send urine to lab       Irlanda Tapia  02/17/23 7863

## 2023-02-22 LAB — PLACENTA IN STORAGE: NORMAL

## 2024-05-28 ENCOUNTER — LAB REQUISITION (OUTPATIENT)
Dept: LAB | Facility: HOSPITAL | Age: 29
End: 2024-05-28
Payer: COMMERCIAL

## 2024-05-28 DIAGNOSIS — Z36.85 ENCOUNTER FOR ANTENATAL SCREENING FOR STREPTOCOCCUS B: ICD-10-CM

## 2024-05-28 PROCEDURE — 87150 DNA/RNA AMPLIFIED PROBE: CPT | Performed by: PHYSICIAN ASSISTANT

## 2024-05-29 LAB — GP B STREP DNA SPEC QL NAA+PROBE: NEGATIVE

## 2024-06-14 ENCOUNTER — HOSPITAL ENCOUNTER (INPATIENT)
Facility: HOSPITAL | Age: 29
LOS: 3 days | Discharge: HOME | End: 2024-06-17
Attending: OBSTETRICS & GYNECOLOGY | Admitting: OBSTETRICS & GYNECOLOGY
Payer: COMMERCIAL

## 2024-06-14 ENCOUNTER — ANESTHESIA EVENT (INPATIENT)
Dept: ANESTHESIOLOGY | Facility: HOSPITAL | Age: 29
End: 2024-06-14
Payer: COMMERCIAL

## 2024-06-14 ENCOUNTER — ANESTHESIA (INPATIENT)
Dept: ANESTHESIOLOGY | Facility: HOSPITAL | Age: 29
End: 2024-06-14
Payer: COMMERCIAL

## 2024-06-14 DIAGNOSIS — O13.9 GESTATIONAL HYPERTENSION: ICD-10-CM

## 2024-06-14 DIAGNOSIS — Z3A.39 39 WEEKS GESTATION OF PREGNANCY: Primary | ICD-10-CM

## 2024-06-14 PROBLEM — O47.9 UTERINE CONTRACTIONS: Status: ACTIVE | Noted: 2024-06-14

## 2024-06-14 PROBLEM — O99.119 GESTATIONAL THROMBOCYTOPENIA (HCC): Status: ACTIVE | Noted: 2024-06-14

## 2024-06-14 PROBLEM — Z34.90 PREGNANT: Status: ACTIVE | Noted: 2024-06-14

## 2024-06-14 PROBLEM — D69.6 GESTATIONAL THROMBOCYTOPENIA (HCC): Status: ACTIVE | Noted: 2024-06-14

## 2024-06-14 LAB
ABO GROUP BLD: NORMAL
ALBUMIN SERPL BCP-MCNC: 3.6 G/DL (ref 3.5–5)
ALP SERPL-CCNC: 136 U/L (ref 34–104)
ALT SERPL W P-5'-P-CCNC: 13 U/L (ref 7–52)
ANION GAP SERPL CALCULATED.3IONS-SCNC: 9 MMOL/L (ref 4–13)
AST SERPL W P-5'-P-CCNC: 20 U/L (ref 13–39)
BASE EXCESS BLDCOA CALC-SCNC: -6.2 MMOL/L (ref 3–11)
BASE EXCESS BLDCOV CALC-SCNC: -6.9 MMOL/L (ref 1–9)
BILIRUB SERPL-MCNC: 0.28 MG/DL (ref 0.2–1)
BLD GP AB SCN SERPL QL: NEGATIVE
BUN SERPL-MCNC: 10 MG/DL (ref 5–25)
CALCIUM SERPL-MCNC: 9.5 MG/DL (ref 8.4–10.2)
CHLORIDE SERPL-SCNC: 106 MMOL/L (ref 96–108)
CO2 SERPL-SCNC: 20 MMOL/L (ref 21–32)
CREAT SERPL-MCNC: 0.53 MG/DL (ref 0.6–1.3)
DME PARACHUTE DELIVERY DATE REQUESTED: NORMAL
DME PARACHUTE DELIVERY NOTE: NORMAL
DME PARACHUTE ITEM DESCRIPTION: NORMAL
DME PARACHUTE ORDER STATUS: NORMAL
DME PARACHUTE SUPPLIER NAME: NORMAL
DME PARACHUTE SUPPLIER PHONE: NORMAL
ERYTHROCYTE [DISTWIDTH] IN BLOOD BY AUTOMATED COUNT: 13.3 % (ref 11.6–15.1)
GFR SERPL CREATININE-BSD FRML MDRD: 129 ML/MIN/1.73SQ M
GLUCOSE SERPL-MCNC: 105 MG/DL (ref 65–140)
HCO3 BLDCOA-SCNC: 23.4 MMOL/L (ref 17.3–27.3)
HCO3 BLDCOV-SCNC: 19.8 MMOL/L (ref 12.2–28.6)
HCT VFR BLD AUTO: 37.6 % (ref 34.8–46.1)
HGB BLD-MCNC: 13 G/DL (ref 11.5–15.4)
HOLD SPECIMEN: NORMAL
MCH RBC QN AUTO: 30.7 PG (ref 26.8–34.3)
MCHC RBC AUTO-ENTMCNC: 34.6 G/DL (ref 31.4–37.4)
MCV RBC AUTO: 89 FL (ref 82–98)
O2 CT VFR BLDCOA CALC: 12.2 ML/DL
OXYHGB MFR BLDCOA: 49.3 %
OXYHGB MFR BLDCOV: 77 %
PCO2 BLDCOA: 62.2 MM[HG] (ref 30–60)
PCO2 BLDCOV: 44.2 MM HG (ref 27–43)
PH BLDCOA: 7.19 [PH] (ref 7.23–7.43)
PH BLDCOV: 7.27 [PH] (ref 7.19–7.49)
PLATELET # BLD AUTO: 143 THOUSANDS/UL (ref 149–390)
PMV BLD AUTO: 12 FL (ref 8.9–12.7)
PO2 BLDCOA: 24.1 MM HG (ref 5–25)
PO2 BLDCOV: 37.2 MM HG (ref 15–45)
POTASSIUM SERPL-SCNC: 3.6 MMOL/L (ref 3.5–5.3)
PROT SERPL-MCNC: 6.5 G/DL (ref 6.4–8.4)
RBC # BLD AUTO: 4.24 MILLION/UL (ref 3.81–5.12)
RH BLD: POSITIVE
SAO2 % BLDCOV: 17.9 ML/DL
SODIUM SERPL-SCNC: 135 MMOL/L (ref 135–147)
SPECIMEN EXPIRATION DATE: NORMAL
WBC # BLD AUTO: 11.99 THOUSAND/UL (ref 4.31–10.16)

## 2024-06-14 PROCEDURE — 4A1HXCZ MONITORING OF PRODUCTS OF CONCEPTION, CARDIAC RATE, EXTERNAL APPROACH: ICD-10-PCS | Performed by: OBSTETRICS & GYNECOLOGY

## 2024-06-14 PROCEDURE — NC001 PR NO CHARGE: Performed by: OBSTETRICS & GYNECOLOGY

## 2024-06-14 PROCEDURE — 82805 BLOOD GASES W/O2 SATURATION: CPT | Performed by: OBSTETRICS & GYNECOLOGY

## 2024-06-14 PROCEDURE — 86850 RBC ANTIBODY SCREEN: CPT

## 2024-06-14 PROCEDURE — 10907ZC DRAINAGE OF AMNIOTIC FLUID, THERAPEUTIC FROM PRODUCTS OF CONCEPTION, VIA NATURAL OR ARTIFICIAL OPENING: ICD-10-PCS | Performed by: OBSTETRICS & GYNECOLOGY

## 2024-06-14 PROCEDURE — 86901 BLOOD TYPING SEROLOGIC RH(D): CPT

## 2024-06-14 PROCEDURE — 85027 COMPLETE CBC AUTOMATED: CPT

## 2024-06-14 PROCEDURE — 80053 COMPREHEN METABOLIC PANEL: CPT

## 2024-06-14 PROCEDURE — 0HQ9XZZ REPAIR PERINEUM SKIN, EXTERNAL APPROACH: ICD-10-PCS | Performed by: OBSTETRICS & GYNECOLOGY

## 2024-06-14 PROCEDURE — 86900 BLOOD TYPING SEROLOGIC ABO: CPT

## 2024-06-14 PROCEDURE — 99211 OFF/OP EST MAY X REQ PHY/QHP: CPT

## 2024-06-14 PROCEDURE — 3E0R3BZ INTRODUCTION OF ANESTHETIC AGENT INTO SPINAL CANAL, PERCUTANEOUS APPROACH: ICD-10-PCS | Performed by: ANESTHESIOLOGY

## 2024-06-14 PROCEDURE — 86780 TREPONEMA PALLIDUM: CPT

## 2024-06-14 RX ORDER — CALCIUM CARBONATE 500 MG/1
1000 TABLET, CHEWABLE ORAL DAILY PRN
Status: DISCONTINUED | OUTPATIENT
Start: 2024-06-14 | End: 2024-06-17 | Stop reason: HOSPADM

## 2024-06-14 RX ORDER — SODIUM CHLORIDE, SODIUM LACTATE, POTASSIUM CHLORIDE, CALCIUM CHLORIDE 600; 310; 30; 20 MG/100ML; MG/100ML; MG/100ML; MG/100ML
125 INJECTION, SOLUTION INTRAVENOUS CONTINUOUS
Status: DISCONTINUED | OUTPATIENT
Start: 2024-06-14 | End: 2024-06-14

## 2024-06-14 RX ORDER — ROPIVACAINE HYDROCHLORIDE 2 MG/ML
INJECTION, SOLUTION EPIDURAL; INFILTRATION; PERINEURAL CONTINUOUS PRN
Status: DISCONTINUED | OUTPATIENT
Start: 2024-06-14 | End: 2024-06-14 | Stop reason: HOSPADM

## 2024-06-14 RX ORDER — ROPIVACAINE HYDROCHLORIDE 5 MG/ML
INJECTION, SOLUTION EPIDURAL; INFILTRATION; PERINEURAL AS NEEDED
Status: DISCONTINUED | OUTPATIENT
Start: 2024-06-14 | End: 2024-06-14 | Stop reason: HOSPADM

## 2024-06-14 RX ORDER — OXYTOCIN/RINGER'S LACTATE 30/500 ML
250 PLASTIC BAG, INJECTION (ML) INTRAVENOUS ONCE
Status: COMPLETED | OUTPATIENT
Start: 2024-06-14 | End: 2024-06-14

## 2024-06-14 RX ORDER — ONDANSETRON 2 MG/ML
4 INJECTION INTRAMUSCULAR; INTRAVENOUS EVERY 8 HOURS PRN
Status: DISCONTINUED | OUTPATIENT
Start: 2024-06-14 | End: 2024-06-17 | Stop reason: HOSPADM

## 2024-06-14 RX ORDER — DIPHENHYDRAMINE HCL 25 MG
25 TABLET ORAL EVERY 6 HOURS PRN
Status: DISCONTINUED | OUTPATIENT
Start: 2024-06-14 | End: 2024-06-17 | Stop reason: HOSPADM

## 2024-06-14 RX ORDER — METOCLOPRAMIDE HYDROCHLORIDE 5 MG/ML
10 INJECTION INTRAMUSCULAR; INTRAVENOUS EVERY 6 HOURS PRN
Status: DISCONTINUED | OUTPATIENT
Start: 2024-06-14 | End: 2024-06-14

## 2024-06-14 RX ORDER — DOCUSATE SODIUM 100 MG/1
100 CAPSULE, LIQUID FILLED ORAL 2 TIMES DAILY
Status: DISCONTINUED | OUTPATIENT
Start: 2024-06-14 | End: 2024-06-17 | Stop reason: HOSPADM

## 2024-06-14 RX ORDER — ADHESIVE BANDAGE 3/4"
BANDAGE TOPICAL 2 TIMES DAILY
Qty: 1 EACH | Refills: 0 | Status: SHIPPED | OUTPATIENT
Start: 2024-06-14

## 2024-06-14 RX ORDER — IBUPROFEN 600 MG/1
600 TABLET ORAL EVERY 6 HOURS
Status: DISCONTINUED | OUTPATIENT
Start: 2024-06-14 | End: 2024-06-17 | Stop reason: HOSPADM

## 2024-06-14 RX ORDER — LIDOCAINE HYDROCHLORIDE AND EPINEPHRINE 15; 5 MG/ML; UG/ML
INJECTION, SOLUTION EPIDURAL
Status: COMPLETED | OUTPATIENT
Start: 2024-06-14 | End: 2024-06-14

## 2024-06-14 RX ORDER — ONDANSETRON 2 MG/ML
4 INJECTION INTRAMUSCULAR; INTRAVENOUS EVERY 6 HOURS PRN
Status: DISCONTINUED | OUTPATIENT
Start: 2024-06-14 | End: 2024-06-14

## 2024-06-14 RX ORDER — ACETAMINOPHEN 325 MG/1
650 TABLET ORAL EVERY 4 HOURS PRN
Status: DISCONTINUED | OUTPATIENT
Start: 2024-06-14 | End: 2024-06-17 | Stop reason: HOSPADM

## 2024-06-14 RX ORDER — BENZOCAINE/MENTHOL 6 MG-10 MG
1 LOZENGE MUCOUS MEMBRANE DAILY PRN
Status: DISCONTINUED | OUTPATIENT
Start: 2024-06-14 | End: 2024-06-17 | Stop reason: HOSPADM

## 2024-06-14 RX ORDER — BUPIVACAINE HYDROCHLORIDE 2.5 MG/ML
30 INJECTION, SOLUTION EPIDURAL; INFILTRATION; INTRACAUDAL ONCE AS NEEDED
Status: DISCONTINUED | OUTPATIENT
Start: 2024-06-14 | End: 2024-06-14

## 2024-06-14 RX ORDER — OXYTOCIN/RINGER'S LACTATE 30/500 ML
PLASTIC BAG, INJECTION (ML) INTRAVENOUS
Status: COMPLETED
Start: 2024-06-14 | End: 2024-06-14

## 2024-06-14 RX ADMIN — BENZOCAINE AND LEVOMENTHOL 1 APPLICATION: 200; 5 SPRAY TOPICAL at 09:00

## 2024-06-14 RX ADMIN — ROPIVACAINE HYDROCHLORIDE 10 ML/HR: 2 INJECTION, SOLUTION EPIDURAL; INFILTRATION at 02:22

## 2024-06-14 RX ADMIN — LIDOCAINE HYDROCHLORIDE AND EPINEPHRINE 3 ML: 15; 5 INJECTION, SOLUTION EPIDURAL at 02:18

## 2024-06-14 RX ADMIN — ACETAMINOPHEN 650 MG: 325 TABLET, FILM COATED ORAL at 17:56

## 2024-06-14 RX ADMIN — IBUPROFEN 600 MG: 600 TABLET, FILM COATED ORAL at 14:57

## 2024-06-14 RX ADMIN — Medication 250 MILLI-UNITS/MIN: at 06:07

## 2024-06-14 RX ADMIN — ROPIVACAINE HYDROCHLORIDE: 2 INJECTION, SOLUTION EPIDURAL; INFILTRATION at 02:30

## 2024-06-14 RX ADMIN — DOCUSATE SODIUM 100 MG: 100 CAPSULE, LIQUID FILLED ORAL at 08:30

## 2024-06-14 RX ADMIN — WITCH HAZEL 1 PAD: 500 SOLUTION RECTAL; TOPICAL at 09:00

## 2024-06-14 RX ADMIN — ONDANSETRON 4 MG: 2 INJECTION INTRAMUSCULAR; INTRAVENOUS at 01:43

## 2024-06-14 RX ADMIN — ROPIVACAINE HYDROCHLORIDE 5 ML: 5 INJECTION, SOLUTION EPIDURAL; INFILTRATION; PERINEURAL at 02:20

## 2024-06-14 RX ADMIN — SODIUM CHLORIDE, SODIUM LACTATE, POTASSIUM CHLORIDE, AND CALCIUM CHLORIDE 999 ML/HR: .6; .31; .03; .02 INJECTION, SOLUTION INTRAVENOUS at 01:15

## 2024-06-14 RX ADMIN — DOCUSATE SODIUM 100 MG: 100 CAPSULE, LIQUID FILLED ORAL at 17:00

## 2024-06-14 RX ADMIN — IBUPROFEN 600 MG: 600 TABLET, FILM COATED ORAL at 08:30

## 2024-06-14 RX ADMIN — METOCLOPRAMIDE 10 MG: 5 INJECTION, SOLUTION INTRAMUSCULAR; INTRAVENOUS at 03:15

## 2024-06-14 RX ADMIN — IBUPROFEN 600 MG: 600 TABLET, FILM COATED ORAL at 21:05

## 2024-06-14 RX ADMIN — SODIUM CHLORIDE, SODIUM LACTATE, POTASSIUM CHLORIDE, AND CALCIUM CHLORIDE 125 ML/HR: .6; .31; .03; .02 INJECTION, SOLUTION INTRAVENOUS at 01:48

## 2024-06-14 RX ADMIN — ACETAMINOPHEN 650 MG: 325 TABLET, FILM COATED ORAL at 11:00

## 2024-06-14 NOTE — PLAN OF CARE
Problem: ANTEPARTUM  Goal: Maintain pregnancy as long as maternal and/or fetal condition is stable  Description: INTERVENTIONS:  - Maternal surveillance  - Fetal surveillance  - Monitor uterine activity  - Medications as ordered  - Bedrest  Outcome: Adequate for Discharge     Problem: BIRTH - VAGINAL/ SECTION  Goal: Fetal and maternal status remain reassuring during the birth process  Description: INTERVENTIONS:  - Monitor vital signs  - Monitor fetal heart rate  - Monitor uterine activity  - Monitor labor progression (vaginal delivery)  - DVT prophylaxis  - Antibiotic prophylaxis  Outcome: Adequate for Discharge  Goal: Emotionally satisfying birthing experience for mother/fetus  Description: Interventions:  - Assess, plan, implement and evaluate the nursing care given to the patient in labor  - Advocate the philosophy that each childbirth experience is a unique experience and support the family's chosen level of involvement and control during the labor process   - Actively participate in both the patient's and family's teaching of the birth process  - Consider cultural, Mormon and age-specific factors and plan care for the patient in labor  Outcome: Adequate for Discharge     Problem: POSTPARTUM  Goal: Experiences normal postpartum course  Description: INTERVENTIONS:  - Monitor maternal vital signs  - Assess uterine involution and lochia  Outcome: Progressing  Goal: Appropriate maternal -  bonding  Description: INTERVENTIONS:  - Identify family support  - Assess for appropriate maternal/infant bonding   -Encourage maternal/infant bonding opportunities  - Referral to  or  as needed  Outcome: Progressing  Goal: Establishment of infant feeding pattern  Description: INTERVENTIONS:  - Assess breast/bottle feeding  - Refer to lactation as needed  Outcome: Progressing  Goal: Incision(s), wounds(s) or drain site(s) healing without S/S of infection  Description: INTERVENTIONS  -  Assess and document dressing, incision, wound bed, drain sites and surrounding tissue  - Provide patient and family education  - Perform skin care/dressing changes every day  Outcome: Progressing

## 2024-06-14 NOTE — ASSESSMENT & PLAN NOTE
Admit to OBGYN   Clear liquid diet   F/u T&S, CBC, RPR   IVF LR 125cc/hr   Continuous fetal monitoring and tocometry   Analgesia at maternal request   Vertex by TAUS  Expectant management

## 2024-06-14 NOTE — OB LABOR/OXYTOCIN SAFETY PROGRESS
Labor Progress Note - Norma Hernandez 29 y.o. female MRN: [id number]    Unit/Bed#: -01 Encounter: [id number]       Contraction Frequency (minutes): 3     Uterine Activity Characteristics: Regular  Cervical Dilation: 5-6        Cervical Effacement: 90  Fetal Station: -1  Baseline Rate (FHR): 130 bpm  Fetal Heart Rate (FHT): 155 BPM  FHR Category: 1               Vital Signs:   Vitals:    06/14/24 0300   BP: 118/77   Pulse: 88   Resp:    Temp:        Notes/comments:   SVE as above.   Continue expectant management       Rosette Olvera MD 6/14/2024 3:33 AM

## 2024-06-14 NOTE — ANESTHESIA POSTPROCEDURE EVALUATION
Post-Op Assessment Note    CV Status:  Stable    Pain management: adequate      Post-op block assessment: no complications and catheter intact   Mental Status:  Awake   Hydration Status:  Stable   PONV Controlled:  Controlled   Airway Patency:  Patent     Post Op Vitals Reviewed: Yes    No anethesia notable event occurred.    Staff: Anesthesiologist               /69 (06/14/24 0645)    Temp      Pulse 78 (06/14/24 0645)   Resp      SpO2

## 2024-06-14 NOTE — OB LABOR/OXYTOCIN SAFETY PROGRESS
Labor Progress Note - Norma Hernandez 29 y.o. female MRN: [id number]    Unit/Bed#: -01 Encounter: [id number]       Contraction Frequency (minutes): 1-4  Contraction Intensity: Mild/Moderate  Uterine Activity Characteristics: Regular  Cervical Dilation: 9        Cervical Effacement: 100  Fetal Station: 1  Baseline Rate (FHR): 130 bpm  Fetal Heart Rate (FHT): 155 BPM  FHR Category: 1               Vital Signs:   Vitals:    06/14/24 0445   BP: 125/67   Pulse: 76   Resp:    Temp: 98.4 °F (36.9 °C)       Notes/comments:   SVE as above.   Patient is currently comfortable with epidural.  AROM for clear fluid..   Continue expectant management.  Discussed with Dr. Dane Olvera MD 6/14/2024 5:20 AM

## 2024-06-14 NOTE — PLAN OF CARE

## 2024-06-14 NOTE — CASE MANAGEMENT
Case Management Progress Note    Patient name Norma Hernandez  Location /-01 MRN [id number]  : 1995 Date 2024       LOS (days): 0  Geometric Mean LOS (GMLOS) (days):   Days to GMLOS:        OBJECTIVE: consult for Spectra S9 breast pump        Current admission status: Inpatient  Preferred Pharmacy:   [address] [place]  [address] [facility]  [address] [facility] [address]  Phone: [phone] Fax: [phone]    Primary Care Provider: No primary care provider on file.    Primary Insurance: A[employer]  Secondary Insurance:     PROGRESS NOTE:  CM called [facility] hospital room.  Confirmed address and phone number.  Ordered Spectra S9 breast pump with [employer].  To be delivered to MOB's home.  In instructions, CM specified DME company to contact MOB if there is any OON cost.

## 2024-06-14 NOTE — ASSESSMENT & PLAN NOTE
CBC with platelets 143, CMP wnl  Procardia XL 30 mg, increased to Procardia 60 mg XL on   Continue to monitor blood pressures  Monitor headache response to morning tylenol and motrin    Systolic (24hrs), Av , Min:114 , Max:149   Diastolic (24hrs), Av, Min:67, Max:108

## 2024-06-14 NOTE — LACTATION NOTE
This note was copied from a baby's chart.  CONSULT - LACTATION  Baby Boy Hernandez (Olivia) 0 days male MRN: [id number]    [facility] NURSERY Room / Bed: (N)/(N) Encounter: [id number]    Maternal Information     MOTHER:  Norma Hernandez  Maternal Age: 29 y.o.  OB History: # 1 - Date: 23, Sex: Male, Weight: 2955 g (6 lb 8.2 oz), GA: 38w4d, Type: Vaginal, Spontaneous, Apgar1: 7, Apgar5: 9, Living: Living, Birth Comments: None    # 2 - Date: 24, Sex: Male, Weight: 2790 g (6 lb 2.4 oz), GA: 39w0d, Type: Vaginal, Spontaneous, Apgar1: 9, Apgar5: 9, Living: Living, Birth Comments: None   Previouse breast reduction surgery? No    Lactation history:   Has patient previously breast fed: Yes   How long had patient previously breast fed: about 1 year   Previous breast feeding complications: Other (Comment) (Establishing breast feeding)     Past Surgical History:   Procedure Laterality Date    TONSILLECTOMY  2014       Birth information:  YOB: 2023   Time of birth: 6:05 AM   Sex: male   Delivery type: Vaginal, Spontaneous   Birth Weight: 2790 g (6 lb 2.4 oz)   Percent of Weight Change: 0%     Gestational Age: 39w0d   [unfilled]    Assessment     Breast and nipple assessment: normal assessment    [place] Assessment: receded chin    Feeding assessment: feeding well with guidance    LATCH:  Latch: Grasps breast, tongue down, lips flanged, rhythmic sucking   Audible Swallowing: A few with stimulation   Type of Nipple: Everted (After stimulation)   Comfort (Breast/Nipple): Soft/non-tender   Hold (Positioning): Partial assist, teach one side, mother does other, staff holds   LATCH Score: 8          Feeding recommendations:  breast feed on demand    In to see family. Mom requested assisting with positioning & latch. Review of  positioning and alignment. Mom is encouraged to:     - Bring baby up to the breast (use of pillows to elevate so baby's torso is  against mom's breasts)   - Skin to skin for feedings with top hand exposed to show signs of satiation   - Chin deep into breast tissue (make baby look up to the nipple)   - nose aligned to the nipple   -Wait for wide gape, drag chin on the breast so nipple is aimed at the upper, back palate  - Cheek should be touching breast   - Deep, firm hold of baby with ear, shoulder, hip alignment    Mom chose to start on left breast using football hold. Worked on positioning infant up at chest level and starting to feed infant with nose arriving at the nipple. Then, using areolar compression to achieve a deep latch that is comfortable and exchanges optimum amounts of milk. Hand over hand guidance to deep latch. Stimulated to suck till asleep at breast. Mom chose to take a break for cramping. Then baby awake in 30 minutes. Mom latched on right breast independently. Family remains supportive at bedside and also shown signs of good latch/feed.       Also went  over Ready, Set Baby and Discharge breastfeeding booklets including the feeding log. Emphasized 8 or more (12) feedings in a 24 hour period, what to expect for the number of diapers per day of life and the progression of properties of the  stooling pattern.    List of reasons to call a lactation consultant.  Feeding logs  Feeding cues  Hand expression  Baby's Second day (cluster feeding)  Breastfeeding and Your Lifestyle (Medications, Alcohol, Caffeine, Smoking, Street Drugs, Methadone)  First Two Weeks Survival Guide for Breastfeeding  Breast Changes  Physical Therapy  Storage and Handling of Breast milk  How to Keep Your Breast Pump Kit Clean  The Employed Breastfeeding Mother  Mixed feeding  Bottle feeding like breastfeeding (paced bottle feeding)  astfeeding and your lifestyle, storage and preparation of breast milk, how to keep you breast pump clean, the employed breastfeeding mother and paced bottle feeding handouts.     Booklet included Breastfeeding Resources  for after discharge including access to the number for the [facility].    Encouraged parents to call for assistance, questions, and concerns about breastfeeding.  Extension provided.            Mary Castillo RN 6/14/2024 2:38 PM

## 2024-06-14 NOTE — ANESTHESIA PROCEDURE NOTES
Epidural Block    Patient location during procedure: OB/L&D  Start time: 6/14/2024 2:17 AM  Reason for block: primary anesthetic  Staffing  Performed by: Isa Manley MD  Authorized by: Isa Manley MD    Epidural  Patient position: sitting  Prep: Betadine  Sedation Level: no sedation  Patient monitoring: continuous pulse oximetry, frequent blood pressure checks and heart rate  Approach: midline  Location: lumbar, L3-4  Injection technique: DONNA saline  Needle  Needle type: Tuohy   Needle gauge: 18 G  Needle insertion depth: 7 cm  Catheter type: multi-orifice  Catheter size: 20 G  Catheter at skin depth: 12 cm  Catheter securement method: clear occlusive dressing  Test dose: negativelidocaine-epinephrine (XYLOCAINE-MPF/EPINEPHRINE) 1.5 %-1:200,000 injection 3 mL - Epidural   3 mL - 6/14/2024 2:18:00 AM  Assessment  Sensory level: T10  Number of attempts: 1negative aspiration for CSF, negative aspiration for heme and no paresthesia on injection  patient tolerated the procedure well with no immediate complications

## 2024-06-14 NOTE — DISCHARGE INSTRUCTIONS
Vaginal Delivery   WHAT YOU SHOULD KNOW:   A vaginal delivery is the birth of your baby through your vagina (birth canal).        AFTER YOU LEAVE:   Medicines:  NSAIDs  help decrease swelling and pain or fever. This medicine is available with or without a doctor's order. NSAIDs can cause stomach bleeding or kidney problems in certain people. If you take blood thinner medicine, always ask your healthcare provider if NSAIDs are safe for you. Always read the medicine label and follow directions.    Take your medicine as directed.  Call your healthcare provider if you think your medicine is not helping or if you have side effects. Tell him if you are allergic to any medicine. Keep a list of the medicines, vitamins, and herbs you take. Include the amounts, and when and why you take them. Bring the list or the pill bottles to follow-up visits. Carry your medicine list with you in case of an emergency.  Follow up with your primary healthcare provider:  Most women need to return 6 weeks after a vaginal delivery. Ask about how to care for your wounds or stitches. Write down your questions so you remember to ask them during your visits.  Activity:  Rest as much as possible. Try to keep all activities short. You may be able to do some exercise soon after you have your baby. Talk with your primary healthcare provider before you start exercising. If you work outside the home, ask when you can return to your job.  Kegel exercises:  Kegel exercises may help your vaginal and rectal muscles heal faster. You can do Kegel exercises by tightening and relaxing the muscles around your vagina. Kegel exercises help make the muscles stronger.   Breast care:  When your milk comes in, your breasts may feel full and hard. Ask how to care for your breasts, even if you are not breastfeeding.   Constipation:  Do not try to push the bowel movement out if it is too hard. High-fiber foods, extra liquids, and regular exercise can help you prevent  constipation. Examples of high-fiber foods are fruit and bran. Prune juice and water are good liquids to drink. Regular exercise helps your digestive system work. You may also be told to take over-the-counter fiber and stool softener medicines. Take these items as directed.   Hemorrhoids:  Pregnancy can cause severe hemorrhoids. You may have rectal pain because of the hemorrhoids. Ask how to prevent or treat hemorrhoids.  Perineum care:  Your perineum is the area between your vagina and anus. Keep the area clean and dry to help it heal and to prevent infection. Wash the area gently with soap and water when you bathe or shower. Rinse your perineum with warm water when you use the toilet. Your primary healthcare provider may suggest you use a warm sitz bath to help decrease pain. A sitz bath is a bathtub or basin filled to hip level. Stay in the sitz bath for 20 to 30 minutes, or as directed.   Vaginal discharge:  You will have vaginal discharge, called lochia, after your delivery. The lochia is bright red the first day or two after the birth. By the fourth day, the amount decreases, and it turns red-brown. Use a sanitary pad rather than a tampon to prevent a vaginal infection. It is normal to have lochia up to 8 weeks after your baby is born.   Monthly periods:  Your period may start again within 7 to 12 weeks after your baby is born. If you are breastfeeding, it may take longer for your period to start again. You can still get pregnant again even though you do not have your monthly period. Talk with your primary healthcare provider about a birth control method that will be good for you if you do not want to get pregnant.  Mood changes:  Many new mothers have some kind of mood changes after delivery. Some of these changes occur because of lack of sleep, hormone changes, and caring for a new baby. Some mood changes can be more serious, such as postpartum depression. Talk with your primary healthcare provider if you  feel unable to care for yourself or your baby.  Sexual activity:  You may need to avoid sex for 6 to 7 weeks after you have your baby. You may notice you have a decreased desire for sex, or sex may be painful. You may need to use a vaginal lubricant (gel) to help make sex more comfortable.  Contact your primary healthcare provider if:   You have heavy vaginal bleeding that fills 1 or more sanitary pads in 1 hour.    You have a fever.    Your pain does not go away, or gets worse.    The skin between your vagina and rectum is swollen, warm, or red.    You have swollen, hard, or painful breasts.    You feel very sad or depressed.    You feel more tired than usual.     You have questions or concerns about your condition or care.  Seek care immediately or call 911 if:   You have pus or yellow drainage coming from your vagina or wound.    You are urinating very little, or not at all.    Your arm or leg feels warm, tender, and painful. It may look swollen and red.    You feel lightheaded, have sudden and worsening chest pain, or trouble breathing. You may have more pain when you take deep breaths or cough, or you may cough up blood.  © 2014 [employer] Information is for End User's use only and may not be sold, redistributed or otherwise used for commercial purposes. All illustrations and images included in CareNotes® are the copyrighted property of [employer] or [employer].  The above information is an  only. It is not intended as medical advice for individual conditions or treatments. Talk to your doctor, nurse or pharmacist before following any medical regimen to see if it is safe and effective for you.

## 2024-06-14 NOTE — QUICK NOTE
Patient now meeting criteria for gestational hypertension. Labs added on.      This patient is Enrolled in the OB Postpartum Blood Pressure Monitoring Program.    Tamara Chiang MD  PGY 1, Obstetrics and Gynecology  6/14/2024  11:51 AM

## 2024-06-14 NOTE — DISCHARGE SUMMARY
Discharge Summary - Norma Hernandez 29 y.o. female MRN: [id number]    Unit/Bed#: -01 Encounter: [id number]    Admission Date: 2024     Discharge Date: 24      Patient Active Problem List   Diagnosis     (spontaneous vaginal delivery)    Chest pain    39 weeks gestation of pregnancy    Uterine contractions    Gestational thrombocytopenia (HCC)    Gestational hypertension       OBGYN Practice: [facility]    Hospital Course:   Norma Hernandez is a 29 y.o.  who was admitted at 39w0d for labor.  On initial cervical exam she was 5/80/-1.  She was admitted and received an epidural for analgesia.  She made cervical change to 8/100/0 station.  She had artificial rupture of membranes for clear fluid. She progressed to complete at 0540, pushed for 13 min.  While pushing fetal heart rate was noted to decelerate to the 60s.  Fetal heart rate would recover to baseline in the 120s to 130s with maternal repositioning.  Vacuum-assisted delivery was recommended however maternal efforts were noted to be excellent and was therefore not necessary. She delivered a healthy  at 0605.   .       Delivery Findings:  Norma delivered a viable male  on 2024 6:05 AM via Vaginal, Spontaneous . The delivery was uncomplicated.    Baby's Weight: 2790 g (6 lb 2.4 oz); 98.41    Apgar scores: 9  and 9  at 1 and 5 minutes, respectively  Anesthesia: Epidural,   QBL: Non-Surgical QBL (mL): 107         was transferred to  nursery. Patient tolerated the procedure well and was transferred to recovery in stable condition.     Her post-partum course was complicated by gestational hypertension with normal labs. She was started on Procardia 30 mg daily which was increased to Procardia 60 mg XL daily on day of discharge.  Her post-partum pain was well controlled with oral analgesics. On day of discharge she was ambulating, voiding spontaneously, tolerating oral intake and  hemodynamically stable. Mom's blood type is A positive and  Rhogam was not given.    She was discharged home on postpartum day #3 without complications. Patient was instructed to follow up with her OB as an outpatient and was given appropriate warnings to call doctor or provider if she develops signs of infection or uncontrolled pain.    Discharge Problem List by Issue:   Gestational hypertension  Assessment & Plan  CBC with platelets 143, CMP wnl  Procardia XL 30 mg, increased to Procardia 60 mg XL on   Continue to monitor blood pressures  Monitor headache response to morning tylenol and motrin    Systolic (24hrs), Av , Min:114 , Max:149   Diastolic (24hrs), Av, Min:67, Max:108        Gestational thrombocytopenia (HCC)  Assessment & Plan  Platelets 143 on admission      (spontaneous vaginal delivery)  Assessment & Plan  FEN: Regular   Pain: Tylenol/Motrin   Encourage breastfeeding   Encourage ambulation         Disposition: Home    Planned Readmission: No    Discharge Medications:   Please see AVS    Discharge instructions :   -Do not place anything (no partner, tampons or douche) in your vagina for 6 weeks.  -You may walk for exercise for the first 6 weeks then gradually return to your usual activities.   -Please do not drive for 1 week if you have no stitches and for 2 weeks if you have stitches or underwent a  delivery.    -You may take baths or shower per your preference.   -Please look at your bust (breasts) in the mirror daily and call your doctor for redness or tenderness or increased warmth.   - If you have had a  section please look at your incision daily as well and call provider for increasing redness or steady drainage from the incision.   -Please call your doctor's office if temperature > 100.4*F or 38* C, worsening pain or a foul discharge.    Follow Up:  - Follow up in 1 week for blood pressure check    Nancy Vela  24  8:42 AM

## 2024-06-14 NOTE — ANESTHESIA PREPROCEDURE EVALUATION
Procedure:  LABOR ANALGESIA    Relevant Problems   CARDIO   (+) Chest pain      GYN   (+) 39 weeks gestation of pregnancy      Obstetrics/Gynecology   (+)  (spontaneous vaginal delivery)   (+) Uterine contractions        Physical Exam    Airway    Mallampati score: II         Dental   No notable dental hx     Cardiovascular  Cardiovascular exam normal    Pulmonary  Pulmonary exam normal     Other Findings  post-pubertal.      Anesthesia Plan  ASA Score- 2     Anesthesia Type- epidural with ASA Monitors.         Additional Monitors:     Airway Plan:            Plan Factors-Exercise tolerance (METS): >4 METS.    Chart reviewed.   Existing labs reviewed.     Patient is not a current smoker.              Induction-     Postoperative Plan-     Perioperative Resuscitation Plan - Level 1 - Full Code.       Informed Consent- Anesthetic plan and risks discussed with patient.

## 2024-06-14 NOTE — OB LABOR/OXYTOCIN SAFETY PROGRESS
Labor Progress Note - Norma Hernandez 29 y.o. female MRN: [id number]    Unit/Bed#: -01 Encounter: [id number]       Contraction Frequency (minutes): 2-4     Uterine Activity Characteristics: Irregular  Cervical Dilation: 8        Cervical Effacement: 100  Fetal Station: 0  Baseline Rate (FHR): 145 bpm  Fetal Heart Rate (FHT): 155 BPM  FHR Category: 1               Vital Signs: 1  Vitals:    06/14/24 0400   BP: 105/61   Pulse: 82   Resp:    Temp:        Notes/comments:   SVE as above  Continue expectant management     Rosette Olvera MD 6/14/2024 4:41 AM

## 2024-06-14 NOTE — L&D DELIVERY NOTE
DELIVERY NOTE  Norma Hernandez 29 y.o. female MRN: [id number]  Unit/Bed#: -01 Encounter: [id number]    Obstetrician:    Dr. Nancy Vela DO    Assistant:   Dr. Rosette Olvera MD    Pre-Delivery Diagnosis:   Patient Active Problem List   Diagnosis        Chest pain    39 weeks gestation of pregnancy    Uterine contractions    Gestational thrombocytopenia (HCC)         Post-Delivery Diagnosis:   Same as above - Delivered  Viable male fetus  1st degree laceration      Procedure:  Spontaneous vaginal delivery      Anesthesia:  epidural    Specimens:   Cord blood obtained   Placenta; normal appearing, central insertion, intact   Arterial and venous blood gases (below)     Gases:  Umbilical Cord Venous Blood Gas:  Results from last 7 days   Lab Units 24  0606   PH COV  7.270   PCO2 COV mm HG 44.2*   HCO3 COV mmol/L 19.8   BASE EXC COV mmol/L -6.9*   O2 CT CD VB mL/dL 17.9   O2 HGB, VENOUS CORD % 77.0     Umbilical Cord Arterial Blood Gas:  Results from last 7 days   Lab Units 24  0606   PH COA  7.193*   PCO2 COA  62.2*   PO2 COA mm HG 24.1   HCO3 COA mmol/L 23.4   BASE EXC COA mmol/L -6.2*   O2 CONTENT CORD ART ml/dl 12.2   O2 HGB, ARTERIAL CORD % 49.3       Quantitative Blood Loss:   164 mL           Complications:    None    Brief Description of Labor Course:  Norma Hernandez is a 29 y.o.  female admitted to L&D at 39w0d for labor.  On initial cervical exam she was 5/80/-1.  She was admitted and received an epidural for analgesia.  She made cervical change to 8/100/0 station.  She had artificial rupture of membranes for clear fluid. She progressed to complete at 0540, pushed for 13 min.  While pushing fetal heart rate was noted to decelerate to the 60s.  Fetal heart rate would recover to baseline in the 120s to 130s with maternal repositioning.  Vacuum-assisted delivery was recommended however maternal efforts were noted to be excellent and was therefore not necessary. She  delivered a healthy  at 0605.     Description of Delivery:   With  the assistance of maternal expulsive forces, the fetal vertex delivered spontaneously. A nuchal cord was  noted and reduced. The anterior right shoulder was delivered atraumatically with gentle downward traction. The contralateral arm was delivered with gentle upward traction resulting in a viable male .     Upon delivery, the infant was placed on the mothers abdomen and the cord was doubly clamped and cut after 30 seconds. The  was noted to have good tone and cry spontaneously.. There was no evidence of injury.  The  was passed off to  staff for evaluation. Umbilical cord blood and umbilical artery and venous gases were collected and sent to the lab. An intact placenta was delivered spontaneously at 0609 using fundal massage and gentle cord traction and was noted to have a centrally-inserted 3-vessel cord. Active management of the third stage of labor was undertaken with IV pitocin at 250milliunits/min.    Bleeding was noted to be under control.       Outcome:  Living  with APGARS 9 (1 min) and 9 (5 min).    weight: pending    Perineal Inspection/Laceration Repair  Inspection of the perineum, vagina, labia, cervix, and urethra revealed a 1st degree laceration, which was repaired in standard fashion with 3-0 Vicryl rapide. Patient was comfortable with epidural at that time. The laceration showed good tissue reapproximation and hemostasis.    At the conclusion of the delivery, all needle, sponge, and instrument counts were noted to be correct. Patient tolerated the procedure well and was allowed to recover in labor and delivery room with family and  before being transferred to the post-partum floor.     Conclusion:  Mother and baby are currently recovering nicely in stable condition.    Attending Supervision:   Dr. Nancy Vela DO was present for the entire procedure.    Rosette  Carey Olvera MD  OB/GYN PGY-2   6/14/2024 6:52 AM

## 2024-06-14 NOTE — H&P
H & P- Obstetrics   Norma Hernandez 29 y.o. female MRN: [id number]  Unit/Bed#:  306-01 Encounter: [id number]    Assessment: 29 y.o.  at Unknown admitted for labor .  SVE: 5/80/-2  FHT:  Baseline of 130/moderate variability/15 x 15 accels present/no decelerations.  Category 1 tracing.  Clinical EFW: 30th percentile; Cephalic confirmed by TAUS  GBS status: Negative       Plan:   Gestational thrombocytopenia (HCC)  Assessment & Plan  Platelets 143 on admission     39 weeks gestation of pregnancy  Assessment & Plan  Admit to OBN   Clear liquid diet   F/u T&S, CBC, RPR   IVF LR 125cc/hr   Continuous fetal monitoring and tocometry   Analgesia at maternal request   Vertex by TAUS  Expectant management              Discussed case and plan w/ Dr. Arnaldo Garcia      Chief Complaint: contractions    HPI: Norma Hernandez is a 29 y.o.  with an STALIN of Not found. at Unknown who is being admitted for labor . She complains of uterine contractions, occurring every 2-3 minutes, has no LOF, and reports no VB. She states she has felt good FM.    Patient Active Problem List   Diagnosis     (spontaneous vaginal delivery)    Chest pain    39 weeks gestation of pregnancy    Uterine contractions    Gestational thrombocytopenia (HCC)       Baby complications/comments: none    Review of Systems   Constitutional:  Negative for chills and fever.   HENT:  Negative for ear pain and sore throat.    Eyes:  Negative for pain and visual disturbance.   Respiratory:  Negative for cough and shortness of breath.    Cardiovascular:  Negative for chest pain and palpitations.   Gastrointestinal:  Negative for abdominal pain and vomiting.   Genitourinary:  Negative for dysuria and hematuria.   Musculoskeletal:  Negative for arthralgias and back pain.   Skin:  Negative for color change and rash.   Neurological:  Negative for seizures and syncope.   All other systems reviewed and are negative.      OB Hx:  OB History     Para Term  AB Living   2 1 1     1   SAB IAB Ectopic Multiple Live Births         0 1      # Outcome Date GA Lbr Jeronimo/2nd Weight Sex Type Anes PTL Lv   2 Current            1 Term 23 38w4d / 00:48 2955 g (6 lb 8.2 oz) M Vag-Spont EPI  WISAM       Past Medical Hx:  No past medical history on file.    Past Surgical hx:  Past Surgical History:   Procedure Laterality Date    TONSILLECTOMY         Social Hx:  Alcohol use: none  Tobacco use: none  Other substance use: none    Other: none    No Known Allergies      Medications Prior to Admission:     acetaminophen (TYLENOL) 325 mg tablet    ibuprofen (MOTRIN) 200 mg tablet    Prenatal MV-Min-Fe Fum-FA-DHA (PRENATAL 1 PO)    Objective:  Temp:  [97.9 °F (36.6 °C)] 97.9 °F (36.6 °C)  HR:  [104] 104  Resp:  [18] 18  Body mass index is 26.63 kg/m².     Physical Exam:  Physical Exam  Constitutional:       Appearance: Normal appearance.   HENT:      Head: Normocephalic and atraumatic.      Nose: Nose normal.      Mouth/Throat:      Mouth: Mucous membranes are moist.      Pharynx: Oropharynx is clear.   Eyes:      Pupils: Pupils are equal, round, and reactive to light.   Cardiovascular:      Rate and Rhythm: Normal rate and regular rhythm.      Pulses: Normal pulses.      Heart sounds: Normal heart sounds.   Pulmonary:      Effort: Pulmonary effort is normal.      Breath sounds: Normal breath sounds.   Abdominal:      General: Bowel sounds are normal. Distension: gravid.   Neurological:      General: No focal deficit present.      Mental Status: She is alert and oriented to person, place, and time. Mental status is at baseline.   Skin:     General: Skin is warm and dry.   Psychiatric:         Mood and Affect: Mood normal.         Behavior: Behavior normal.         Thought Content: Thought content normal.         Judgment: Judgment normal.                Lab Results   Component Value Date    WBC 11.99 (H) 2024    HGB 13.0 2024    HCT 37.6 2024    PLT  143 (L) 06/14/2024     Lab Results   Component Value Date    K 4.0 02/17/2023     (H) 02/17/2023    CO2 25 02/17/2023    BUN 13 02/17/2023    CREATININE 0.60 02/17/2023    AST 47 (H) 02/17/2023    ALT 34 02/17/2023     Prenatal Labs: Reviewed      Blood type: A Positive  Antibody: Negative  GBS: Negative  HIV: Non Reactive  Rubella: Immune  Syphilis IgM/IgG: Non reactive  HBsAg: Non Reactive  HCAb: Non Reactive  Chlamydia: Negative  Gonorrhea: Negative  Diabetes 1 hour screen: 92  Platelets: 166 on 4/4, will repeat on admission  Hgb: 12.4 on 4/4, will repeat on admission   >2 Midnights  INPATIENT     Signature/Title: Rosette Olvera MD  Date: 6/14/2024  Time: 2:02 AM

## 2024-06-15 PROCEDURE — NC001 PR NO CHARGE: Performed by: OBSTETRICS & GYNECOLOGY

## 2024-06-15 RX ADMIN — IBUPROFEN 600 MG: 600 TABLET, FILM COATED ORAL at 02:38

## 2024-06-15 RX ADMIN — ACETAMINOPHEN 650 MG: 325 TABLET, FILM COATED ORAL at 17:20

## 2024-06-15 RX ADMIN — DOCUSATE SODIUM 100 MG: 100 CAPSULE, LIQUID FILLED ORAL at 17:20

## 2024-06-15 RX ADMIN — DOCUSATE SODIUM 100 MG: 100 CAPSULE, LIQUID FILLED ORAL at 08:38

## 2024-06-15 RX ADMIN — IBUPROFEN 600 MG: 600 TABLET, FILM COATED ORAL at 23:25

## 2024-06-15 RX ADMIN — IBUPROFEN 600 MG: 600 TABLET, FILM COATED ORAL at 15:21

## 2024-06-15 RX ADMIN — WITCH HAZEL 1 PAD: 500 SOLUTION RECTAL; TOPICAL at 04:47

## 2024-06-15 RX ADMIN — IBUPROFEN 600 MG: 600 TABLET, FILM COATED ORAL at 08:38

## 2024-06-15 NOTE — PLAN OF CARE
Problem: POSTPARTUM  Goal: Experiences normal postpartum course  Description: INTERVENTIONS:  - Monitor maternal vital signs  - Assess uterine involution and lochia  Outcome: Progressing  Goal: Appropriate maternal -  bonding  Description: INTERVENTIONS:  - Identify family support  - Assess for appropriate maternal/infant bonding   -Encourage maternal/infant bonding opportunities  - Referral to  or  as needed  Outcome: Progressing  Goal: Establishment of infant feeding pattern  Description: INTERVENTIONS:  - Assess breast/bottle feeding  - Refer to lactation as needed  Outcome: Progressing  Goal: Incision(s), wounds(s) or drain site(s) healing without S/S of infection  Description: INTERVENTIONS  - Assess and document dressing, incision, wound bed, drain sites and surrounding tissue  - Provide patient and family education  Outcome: Progressing     Problem: PAIN - ADULT  Goal: Verbalizes/displays adequate comfort level or baseline comfort level  Description: Interventions:  - Encourage patient to monitor pain and request assistance  - Assess pain using appropriate pain scale  - Administer analgesics based on type and severity of pain and evaluate response  - Implement non-pharmacological measures as appropriate and evaluate response  - Consider cultural and social influences on pain and pain management  - Notify physician/advanced practitioner if interventions unsuccessful or patient reports new pain  Outcome: Progressing     Problem: Knowledge Deficit  Goal: Patient/family/caregiver demonstrates understanding of disease process, treatment plan, medications, and discharge instructions  Description: Complete learning assessment and assess knowledge base.  Interventions:  - Provide teaching at level of understanding  - Provide teaching via preferred learning methods  Outcome: Progressing     Problem: DISCHARGE PLANNING  Goal: Discharge to home or other facility with appropriate  resources  Description: INTERVENTIONS:  - Identify barriers to discharge w/patient and caregiver  - Arrange for needed discharge resources and transportation as appropriate  - Identify discharge learning needs (meds, wound care, etc.)  - Arrange for interpretive services to assist at discharge as needed  - Refer to Case Management Department for coordinating discharge planning if the patient needs post-hospital services based on physician/advanced practitioner order or complex needs related to functional status, cognitive ability, or social support system  Outcome: Progressing

## 2024-06-15 NOTE — NURSING NOTE
Discharge teaching done, pt verbalized understanding appropriate questions asked. Save your life magnet given to pt.

## 2024-06-15 NOTE — LACTATION NOTE
This note was copied from a baby's chart.    In to see family to assess feeding as per request. In earlier and back for verbal guidance for positioning/maintaining deep latch. Informing parents to - Pay close attention to positioning for a deeper latch. Gently compress the breast as if offering a sandwich with your fingers and thumb in parallel with baby's  lips. Bring Lopez  to the breast so that his lower lip and chin touch the breast with his nose just above the nipple.     Mom is gaining confidence with nursing baby. Dad also aware of signs of a good latch/feed and remains supportive at bedside. Parents seem pleased with session.     06/15/24 2965   Maternal Information   Has mother  before? Yes   How long did the the mother previously breastfeed? about 1 year   Previous Maternal Breastfeeding Challenges Breast/nipple pain;Other (Comment)  (establishing breast feeding)   Infant to breast within first hour of birth? Yes   Exclusive Pump and Bottle Feed No   LATCH Documentation   Latch 2   Audible Swallowing 1   Type of Nipple 2   Comfort (Breast/Nipple) 2   Hold (Positioning) 2   LATCH Score 9   Having latch problems? No  (working on consistent deep latch)   Position(s) Used Football   Breasts/Nipples   Left Breast Filling   Right Breast Filling   Left Nipple Everted   Right Nipple Everted   Intervention Other (comment)  (Latch assess and support)   Breastfeeding Progress Not yet established   Breast Pump   Pump 3  (CMfor Spectra S9)   Patient Follow-Up   Lactation Consult Status 2   Follow-Up Type Inpatient;Call as needed   Other OB Lactation Documentation    Additional Problem Noted Latch assess and support  (has BOTH Booklets)     Encouraged parents to call for assistance, questions, and concerns about breastfeeding.  Extension provided.

## 2024-06-15 NOTE — PROGRESS NOTES
"Progress Note - OB/GYN  Norma Hernandez 29 y.o. female MRN: [id number]  Unit/Bed#:  411-01 Encounter: [id number]    Assessment and Plan     Norma Hernandez is a patient of: [facility]. She is PPD# 1 s/p Spontaneous vaginal delivery.  Recovering well and is stable       Gestational hypertension  Assessment & Plan  CBC with platelets 143, CMP wnl  Continue to monitor blood pressures    Systolic (24hrs), Av , Min:125 , Max:140   Diastolic (24hrs), Av, Min:85, Max:96        Gestational thrombocytopenia (HCC)  Assessment & Plan  Platelets 143 on admission      (spontaneous vaginal delivery)  Assessment & Plan  FEN: Regular   Pain: Tylenol/Motrin   Encourage breastfeeding   Encourage ambulation            Disposition    - Anticipate discharge home on PPD #1-2      Subjective/Objective     Chief Complaint: Postpartum State     Subjective:    Norma Hernandez is PPD#1 s/p Spontaneous vaginal delivery. She has no current complaints.  Pain is well controlled.  Patient is currently voiding.  She is ambulating.  Patient is not currently passing flatus and has had no bowel movement. She is tolerating PO, and denies nausea or vomitting. Patient denies fever, chills, chest pain, shortness of breath, or calf tenderness. Lochia is normal. She is  Breastfeeding. She is recovering well and is stable.       Vitals:   /89 (BP Location: Left arm)   Pulse 84   Temp 98.2 °F (36.8 °C) (Oral)   Resp 16   Ht 5' 6\" (1.676 m)   Wt 74.8 kg (165 lb)   SpO2 98%   Breastfeeding Yes   BMI 26.63 kg/m²       Intake/Output Summary (Last 24 hours) at 6/15/2024 0829  Last data filed at 2024 1115  Gross per 24 hour   Intake --   Output 750 ml   Net -750 ml       Invasive Devices       Peripheral Intravenous Line  Duration             Peripheral IV 24 Left;Ventral (anterior) Forearm 1 day                    Physical Exam:   GEN: Norma Hernandez appears well, alert and oriented x 3, " pleasant and cooperative   CARDIO: RRR, no murmurs or rubs  RESP:  CTAB, no wheezes or rales  ABDOMEN: soft, no tenderness, no distention, fundus @ 1 below the umbilicus   EXTREMITIES: non tender       Labs:     Hemoglobin   Date Value Ref Range Status   06/14/2024 13.0 11.5 - 15.4 g/dL Final   02/17/2023 12.0 11.5 - 15.4 g/dL Final     WBC   Date Value Ref Range Status   06/14/2024 11.99 (H) 4.31 - 10.16 Thousand/uL Final   02/17/2023 8.88 4.31 - 10.16 Thousand/uL Final     Platelets   Date Value Ref Range Status   06/14/2024 143 (L) 149 - 390 Thousands/uL Final   02/17/2023 165 149 - 390 Thousands/uL Final     Creatinine   Date Value Ref Range Status   06/14/2024 0.53 (L) 0.60 - 1.30 mg/dL Final     Comment:     Standardized to IDMS reference method   02/17/2023 0.60 0.60 - 1.30 mg/dL Final     Comment:     Standardized to IDMS reference method   03/31/2022 0.73 0.40 - 1.10 mg/dL Final     AST   Date Value Ref Range Status   06/14/2024 20 13 - 39 U/L Final   02/17/2023 47 (H) 13 - 39 U/L Final     Comment:     Specimen collection should occur prior to Sulfasalazine administration due to the potential for falsely depressed results.    03/31/2022 18 <41 U/L Final     ALT   Date Value Ref Range Status   06/14/2024 13 7 - 52 U/L Final     Comment:     Specimen collection should occur prior to Sulfasalazine administration due to the potential for falsely depressed results.    02/17/2023 34 7 - 52 U/L Final     Comment:     Specimen collection should occur prior to Sulfasalazine administration due to the potential for falsely depressed results.    03/31/2022 35 <56 U/L Final          Brandy Garnica MD  6/15/2024  8:29 AM

## 2024-06-16 LAB
ALBUMIN SERPL BCP-MCNC: 3.6 G/DL (ref 3.5–5)
ALP SERPL-CCNC: 125 U/L (ref 34–104)
ALT SERPL W P-5'-P-CCNC: 15 U/L (ref 7–52)
ANION GAP SERPL CALCULATED.3IONS-SCNC: 6 MMOL/L (ref 4–13)
AST SERPL W P-5'-P-CCNC: 22 U/L (ref 13–39)
ATRIAL RATE: 82 BPM
BASOPHILS # BLD AUTO: 0.05 THOUSANDS/ÂΜL (ref 0–0.1)
BASOPHILS NFR BLD AUTO: 0 % (ref 0–1)
BILIRUB SERPL-MCNC: 0.24 MG/DL (ref 0.2–1)
BUN SERPL-MCNC: 11 MG/DL (ref 5–25)
CALCIUM SERPL-MCNC: 9.5 MG/DL (ref 8.4–10.2)
CHLORIDE SERPL-SCNC: 108 MMOL/L (ref 96–108)
CO2 SERPL-SCNC: 27 MMOL/L (ref 21–32)
CREAT SERPL-MCNC: 0.79 MG/DL (ref 0.6–1.3)
CREAT UR-MCNC: 19.9 MG/DL
EOSINOPHIL # BLD AUTO: 0.09 THOUSAND/ÂΜL (ref 0–0.61)
EOSINOPHIL NFR BLD AUTO: 1 % (ref 0–6)
ERYTHROCYTE [DISTWIDTH] IN BLOOD BY AUTOMATED COUNT: 14 % (ref 11.6–15.1)
GFR SERPL CREATININE-BSD FRML MDRD: 102 ML/MIN/1.73SQ M
GLUCOSE SERPL-MCNC: 72 MG/DL (ref 65–140)
HCT VFR BLD AUTO: 40.7 % (ref 34.8–46.1)
HGB BLD-MCNC: 13.2 G/DL (ref 11.5–15.4)
IMM GRANULOCYTES # BLD AUTO: 0.11 THOUSAND/UL (ref 0–0.2)
IMM GRANULOCYTES NFR BLD AUTO: 1 % (ref 0–2)
LYMPHOCYTES # BLD AUTO: 1.71 THOUSANDS/ÂΜL (ref 0.6–4.47)
LYMPHOCYTES NFR BLD AUTO: 15 % (ref 14–44)
MCH RBC QN AUTO: 31.1 PG (ref 26.8–34.3)
MCHC RBC AUTO-ENTMCNC: 32.4 G/DL (ref 31.4–37.4)
MCV RBC AUTO: 96 FL (ref 82–98)
MONOCYTES # BLD AUTO: 0.79 THOUSAND/ÂΜL (ref 0.17–1.22)
MONOCYTES NFR BLD AUTO: 7 % (ref 4–12)
NEUTROPHILS # BLD AUTO: 8.55 THOUSANDS/ÂΜL (ref 1.85–7.62)
NEUTS SEG NFR BLD AUTO: 76 % (ref 43–75)
NRBC BLD AUTO-RTO: 0 /100 WBCS
P AXIS: 54 DEGREES
PLATELET # BLD AUTO: 151 THOUSANDS/UL (ref 149–390)
PMV BLD AUTO: 11.2 FL (ref 8.9–12.7)
POTASSIUM SERPL-SCNC: 3.9 MMOL/L (ref 3.5–5.3)
PR INTERVAL: 138 MS
PROT SERPL-MCNC: 6.8 G/DL (ref 6.4–8.4)
PROT UR-MCNC: 4 MG/DL
PROT/CREAT UR: 0.2 MG/G{CREAT} (ref 0–0.1)
QRS AXIS: 41 DEGREES
QRSD INTERVAL: 84 MS
QT INTERVAL: 362 MS
QTC INTERVAL: 422 MS
RBC # BLD AUTO: 4.24 MILLION/UL (ref 3.81–5.12)
SODIUM SERPL-SCNC: 141 MMOL/L (ref 135–147)
T WAVE AXIS: 42 DEGREES
TREPONEMA PALLIDUM IGG+IGM AB [PRESENCE] IN SERUM OR PLASMA BY IMMUNOASSAY: NORMAL
VENTRICULAR RATE: 82 BPM
WBC # BLD AUTO: 11.3 THOUSAND/UL (ref 4.31–10.16)

## 2024-06-16 PROCEDURE — 84156 ASSAY OF PROTEIN URINE: CPT | Performed by: OBSTETRICS & GYNECOLOGY

## 2024-06-16 PROCEDURE — 93010 ELECTROCARDIOGRAM REPORT: CPT | Performed by: INTERNAL MEDICINE

## 2024-06-16 PROCEDURE — NC001 PR NO CHARGE: Performed by: OBSTETRICS & GYNECOLOGY

## 2024-06-16 PROCEDURE — 80053 COMPREHEN METABOLIC PANEL: CPT | Performed by: OBSTETRICS & GYNECOLOGY

## 2024-06-16 PROCEDURE — 93005 ELECTROCARDIOGRAM TRACING: CPT

## 2024-06-16 PROCEDURE — 99024 POSTOP FOLLOW-UP VISIT: CPT | Performed by: OBSTETRICS & GYNECOLOGY

## 2024-06-16 PROCEDURE — 85025 COMPLETE CBC W/AUTO DIFF WBC: CPT | Performed by: OBSTETRICS & GYNECOLOGY

## 2024-06-16 PROCEDURE — 82570 ASSAY OF URINE CREATININE: CPT | Performed by: OBSTETRICS & GYNECOLOGY

## 2024-06-16 RX ORDER — FAMOTIDINE 20 MG/1
20 TABLET, FILM COATED ORAL 2 TIMES DAILY
Status: DISCONTINUED | OUTPATIENT
Start: 2024-06-16 | End: 2024-06-17 | Stop reason: HOSPADM

## 2024-06-16 RX ORDER — NIFEDIPINE 30 MG/1
30 TABLET, EXTENDED RELEASE ORAL DAILY
Status: DISCONTINUED | OUTPATIENT
Start: 2024-06-16 | End: 2024-06-16

## 2024-06-16 RX ORDER — NIFEDIPINE 30 MG/1
30 TABLET, EXTENDED RELEASE ORAL ONCE
Status: COMPLETED | OUTPATIENT
Start: 2024-06-16 | End: 2024-06-16

## 2024-06-16 RX ORDER — LANOLIN ALCOHOL/MO/W.PET/CERES
400 CREAM (GRAM) TOPICAL DAILY
Status: DISCONTINUED | OUTPATIENT
Start: 2024-06-16 | End: 2024-06-17 | Stop reason: HOSPADM

## 2024-06-16 RX ORDER — SIMETHICONE 80 MG
80 TABLET,CHEWABLE ORAL
Status: DISCONTINUED | OUTPATIENT
Start: 2024-06-16 | End: 2024-06-17 | Stop reason: HOSPADM

## 2024-06-16 RX ORDER — NIFEDIPINE 30 MG/1
60 TABLET, EXTENDED RELEASE ORAL DAILY
Status: DISCONTINUED | OUTPATIENT
Start: 2024-06-17 | End: 2024-06-17 | Stop reason: HOSPADM

## 2024-06-16 RX ADMIN — FAMOTIDINE 20 MG: 20 TABLET, FILM COATED ORAL at 09:23

## 2024-06-16 RX ADMIN — ACETAMINOPHEN 650 MG: 325 TABLET, FILM COATED ORAL at 15:06

## 2024-06-16 RX ADMIN — ACETAMINOPHEN 650 MG: 325 TABLET, FILM COATED ORAL at 01:19

## 2024-06-16 RX ADMIN — IBUPROFEN 600 MG: 600 TABLET, FILM COATED ORAL at 14:26

## 2024-06-16 RX ADMIN — ACETAMINOPHEN 650 MG: 325 TABLET, FILM COATED ORAL at 20:08

## 2024-06-16 RX ADMIN — SIMETHICONE 80 MG: 80 TABLET, CHEWABLE ORAL at 21:30

## 2024-06-16 RX ADMIN — SIMETHICONE 80 MG: 80 TABLET, CHEWABLE ORAL at 12:34

## 2024-06-16 RX ADMIN — DOCUSATE SODIUM 100 MG: 100 CAPSULE, LIQUID FILLED ORAL at 17:23

## 2024-06-16 RX ADMIN — NIFEDIPINE 30 MG: 30 TABLET, EXTENDED RELEASE ORAL at 09:23

## 2024-06-16 RX ADMIN — IBUPROFEN 600 MG: 600 TABLET, FILM COATED ORAL at 20:08

## 2024-06-16 RX ADMIN — FAMOTIDINE 20 MG: 20 TABLET, FILM COATED ORAL at 17:23

## 2024-06-16 RX ADMIN — SIMETHICONE 80 MG: 80 TABLET, CHEWABLE ORAL at 15:54

## 2024-06-16 RX ADMIN — IBUPROFEN 600 MG: 600 TABLET, FILM COATED ORAL at 08:27

## 2024-06-16 RX ADMIN — NIFEDIPINE 30 MG: 30 TABLET, EXTENDED RELEASE ORAL at 20:09

## 2024-06-16 RX ADMIN — MAGNESIUM OXIDE TAB 400 MG (241.3 MG ELEMENTAL MG) 400 MG: 400 (241.3 MG) TAB at 15:54

## 2024-06-16 RX ADMIN — DOCUSATE SODIUM 100 MG: 100 CAPSULE, LIQUID FILLED ORAL at 08:27

## 2024-06-16 NOTE — PLAN OF CARE
Problem: POSTPARTUM  Goal: Experiences normal postpartum course  Description: INTERVENTIONS:  - Monitor maternal vital signs  - Assess uterine involution and lochia  Outcome: Progressing  Goal: Appropriate maternal -  bonding  Description: INTERVENTIONS:  - Identify family support  - Assess for appropriate maternal/infant bonding   -Encourage maternal/infant bonding opportunities  - Referral to  or  as needed  Outcome: Progressing  Goal: Establishment of infant feeding pattern  Description: INTERVENTIONS:  - Assess breast/bottle feeding  - Refer to lactation as needed  Outcome: Progressing  Goal: Incision(s), wounds(s) or drain site(s) healing without S/S of infection  Description: INTERVENTIONS  - Assess and document dressing, incision, wound bed, drain sites and surrounding tissue  - Provide patient and family education  - Perform skin care/dressing changes every day  Outcome: Progressing     Problem: PAIN - ADULT  Goal: Verbalizes/displays adequate comfort level or baseline comfort level  Description: Interventions:  - Encourage patient to monitor pain and request assistance  - Assess pain using appropriate pain scale  - Administer analgesics based on type and severity of pain and evaluate response  - Implement non-pharmacological measures as appropriate and evaluate response  - Consider cultural and social influences on pain and pain management  - Notify physician/advanced practitioner if interventions unsuccessful or patient reports new pain  Outcome: Progressing     Problem: Knowledge Deficit  Goal: Patient/family/caregiver demonstrates understanding of disease process, treatment plan, medications, and discharge instructions  Description: Complete learning assessment and assess knowledge base.  Interventions:  - Provide teaching at level of understanding  - Provide teaching via preferred learning methods  Outcome: Progressing     Problem: DISCHARGE PLANNING  Goal: Discharge to  home or other facility with appropriate resources  Description: INTERVENTIONS:  - Identify barriers to discharge w/patient and caregiver  - Arrange for needed discharge resources and transportation as appropriate  - Identify discharge learning needs (meds, wound care, etc.)  - Arrange for interpretive services to assist at discharge as needed  - Refer to Case Management Department for coordinating discharge planning if the patient needs post-hospital services based on physician/advanced practitioner order or complex needs related to functional status, cognitive ability, or social support system  Outcome: Progressing

## 2024-06-16 NOTE — LACTATION NOTE
This note was copied from a baby's chart.   06/16/24 1200   Lactation Consultation   Reason for Consult 10 m   Breasts/Nipples   Left Breast Filling   Right Breast Filling   Breastfeeding Progress Breastfeeding well  (Per Norma)   Patient Follow-Up   Lactation Consult Status 2   Follow-Up Type Inpatient;Call as needed   Other OB Lactation Documentation    Additional Problem Noted Norma says breastfeeding is going well. Breasts are filling.   Encouraged parents to call for assistance, questions, and concerns about breastfeeding.  Extension provided.

## 2024-06-16 NOTE — PROGRESS NOTES
Met with patient at bedside to discuss headache. She says that it is no better, but mild in terms of headaches she usually gets, she doesn't want any additional meds at this time.   She is amenable to staying overnight as her blood pressures remain consistently elevated.   Discussed with Dr. Vela.     Rosette Olvera MD  OBGYN PGY-2  6/16/2024 5:54 PM

## 2024-06-16 NOTE — PROGRESS NOTES
Called to bedside to assess patient in the setting of chest pain/pressure. Norma reports that chest pressure/pain started yesterday (6/15) afternoon. She states that the pain feels deep and is worse with inspiration. She denies any associated shortness of breath. She also endorses slight headache. She denies any vision changes at this time. She states that this pressure/pain in her chest feels identical to after the delivery of her first child when she was readmitted postpartum for similar symptoms. She states that she underwent extensive workup at that time including EKG, laboratory evaluation and CT scan all of which were unremarkable and that her pain was thought to be secondary to exertion.     Physical Exam  Constitutional:       General: She is not in acute distress.     Appearance: Normal appearance.   HENT:      Head: Normocephalic and atraumatic.   Cardiovascular:      Rate and Rhythm: Normal rate and regular rhythm.   Pulmonary:      Effort: Pulmonary effort is normal.      Breath sounds: Normal breath sounds.   Chest:      Chest wall: Tenderness present.      Comments: Slight tenderness to palpation over the sternum  Neurological:      Mental Status: She is alert.       Plan:   - last received tylenol around 1730 yesterday, will plan for repeat dose of tylenol now  - Plan for EKG to evaluate chest pain/pressure     Brandy Garnica MD  Obstetrics & Gynecology PGY-3  6/16/2024  12:54 AM

## 2024-06-16 NOTE — PROGRESS NOTES
To bedside to reassess pt.    Patient reports has had HA off and on much of the day. Nagging in quality. Not really relieved with motrin or tylenol. Poor sleep noted, and also notes hx headaches during her schooling that required amitriptyline for suppression. This feels similar. No visual changes. Some upper abd pain. No other acute concerns noted.    Temp:  [97.7 °F (36.5 °C)-98 °F (36.7 °C)] (P) 97.8 °F (36.6 °C)  HR:  [77-88] (P) 87  Resp:  [16-18] (P) 16  BP: (121-151)/() (P) 149/97    Physical Exam  GEN: NAD, resting comfortably  Pulm: breathing comfortably on room air, no inc work of breathing  ABD: soft, ntnd      Lab Results   Component Value Date    WBC 11.30 (H) 2024    HGB 13.2 2024    HCT 40.7 2024    MCV 96 2024     2024     Lab Results   Component Value Date    SODIUM 141 2024    K 3.9 2024     2024    CO2 27 2024    BUN 11 2024    CREATININE 0.79 2024    GLUC 72 2024    CALCIUM 9.5 2024     Lab Results   Component Value Date    ALT 15 2024    AST 22 2024    ALKPHOS 125 (H) 2024     A/P: 29 y.o.  PPD#2 s/p , complicated by GHTN and started on procardia XL 30mg. BPs starting to improve on antihypertensive, carefully observing to determine if dose increase needed.  Headache noted, which is not atypical for her but also not relieved with medications. Discussed alternative treatment, such as imitrex. Declines presently. Plan to assess further with P:C ratio, admin mag ox, recheck 1hr. Reviewed with pt severe features of preeclampsia and may require magnesium sulfate infusion.     Discussed reassessment with Dr Arnaldo Garcia

## 2024-06-16 NOTE — PROGRESS NOTES
"Progress Note - OB/GYN   Norma Hernandez 29 y.o. female MRN: [id number]  Unit/Bed#: -01 Encounter: [id number]    Assessment:  Post partum Day #2 s/p , stable, baby in room    Plan:  Gestational hypertension  Assessment & Plan  CBC with platelets 143, CMP wnl  Continue to monitor blood pressures  Given upward trend in blood pressures plan to start Procardia XL 30 mg  Systolic (24hrs), Av , Min:121 , Max:151   Diastolic (24hrs), Av, Min:74, Max:107        Gestational thrombocytopenia (HCC)  Assessment & Plan  Platelets 143 on admission      (spontaneous vaginal delivery)  Assessment & Plan  FEN: Regular   Pain: Tylenol/Motrin   Encourage breastfeeding   Encourage ambulation    * 39 weeks gestation of pregnancy  Assessment & Plan  Admit to OBN   Clear liquid diet   F/u T&S, CBC, RPR   IVF LR 125cc/hr   Continuous fetal monitoring and tocometry   Analgesia at maternal request   Vertex by TAUS  Expectant management            Subjective/Objective   Chief Complaint:     Post delivery. Patient is doing well. Lochia WNL.  Patient complains of a 2 out of 10 headache with associated chest pressure.  Patient had chest pressure in the postpartum period of her last pregnancy.  EKG done overnight which was normal.  Denies shortness of breath or palpitations.    Subjective:     Pain: yes, cramping, improved with meds  Tolerating PO: yes  Voiding: yes  Flatus: yes  Ambulating: yes  Chest pressure: yes, but no palipations  Shortness of breath: no  Leg pain: no  Lochia: minimal    Objective:     Vitals: /82 Comment: manual md aware  Pulse 77   Temp 97.7 °F (36.5 °C) (Oral)   Resp 18   Ht 5' 6\" (1.676 m)   Wt 74.8 kg (165 lb)   SpO2 98%   Breastfeeding Yes   BMI 26.63 kg/m²     I/O          0701  06/15 0700 06/15 0701   07 07 0700    Urine (mL/kg/hr) 750 (0.4)      Blood       Total Output 750      Net -750                     Lab Results   Component Value Date    " WBC 11.99 (H) 06/14/2024    HGB 13.0 06/14/2024    HCT 37.6 06/14/2024    MCV 89 06/14/2024     (L) 06/14/2024       Physical Exam:     Gen: AAOx3, NAD  CV: no acute distress  Lungs: no acute distress  Abd: Soft, non-tender, non-distended, no rebound or guarding  Uterine fundus firm and non-tender, 1 cm below the umbilicus.  Ext: Non tender    Chantel Seema Herrera MD  OBGYN PGY-3  6/16/2024  7:24 AM

## 2024-06-17 VITALS
DIASTOLIC BLOOD PRESSURE: 92 MMHG | TEMPERATURE: 97.6 F | HEART RATE: 92 BPM | RESPIRATION RATE: 18 BRPM | SYSTOLIC BLOOD PRESSURE: 126 MMHG | WEIGHT: 165 LBS | BODY MASS INDEX: 26.52 KG/M2 | HEIGHT: 66 IN | OXYGEN SATURATION: 98 %

## 2024-06-17 LAB
DME PARACHUTE DELIVERY DATE ACTUAL: NORMAL
DME PARACHUTE DELIVERY DATE REQUESTED: NORMAL
DME PARACHUTE DELIVERY NOTE: NORMAL
DME PARACHUTE ITEM DESCRIPTION: NORMAL
DME PARACHUTE ORDER STATUS: NORMAL
DME PARACHUTE SUPPLIER NAME: NORMAL
DME PARACHUTE SUPPLIER PHONE: NORMAL

## 2024-06-17 PROCEDURE — NC001 PR NO CHARGE: Performed by: OBSTETRICS & GYNECOLOGY

## 2024-06-17 RX ORDER — IBUPROFEN 600 MG/1
600 TABLET ORAL EVERY 6 HOURS
Start: 2024-06-17

## 2024-06-17 RX ORDER — ACETAMINOPHEN 325 MG/1
650 TABLET ORAL EVERY 4 HOURS PRN
Start: 2024-06-17

## 2024-06-17 RX ORDER — BENZOCAINE/MENTHOL 6 MG-10 MG
1 LOZENGE MUCOUS MEMBRANE DAILY PRN
Start: 2024-06-17

## 2024-06-17 RX ORDER — NIFEDIPINE 30 MG/1
60 TABLET, EXTENDED RELEASE ORAL DAILY
Qty: 60 TABLET | Refills: 0 | OUTPATIENT
Start: 2024-06-17 | End: 2024-07-17

## 2024-06-17 RX ORDER — NIFEDIPINE 30 MG/1
60 TABLET, EXTENDED RELEASE ORAL DAILY
Qty: 120 TABLET | Refills: 1 | Status: SHIPPED | OUTPATIENT
Start: 2024-06-18

## 2024-06-17 RX ADMIN — BENZOCAINE AND LEVOMENTHOL 1 APPLICATION: 200; 5 SPRAY TOPICAL at 15:02

## 2024-06-17 RX ADMIN — IBUPROFEN 600 MG: 600 TABLET, FILM COATED ORAL at 02:05

## 2024-06-17 RX ADMIN — IBUPROFEN 600 MG: 600 TABLET, FILM COATED ORAL at 15:02

## 2024-06-17 RX ADMIN — DOCUSATE SODIUM 100 MG: 100 CAPSULE, LIQUID FILLED ORAL at 08:27

## 2024-06-17 RX ADMIN — MAGNESIUM OXIDE TAB 400 MG (241.3 MG ELEMENTAL MG) 400 MG: 400 (241.3 MG) TAB at 08:27

## 2024-06-17 RX ADMIN — IBUPROFEN 600 MG: 600 TABLET, FILM COATED ORAL at 08:28

## 2024-06-17 RX ADMIN — NIFEDIPINE 60 MG: 30 TABLET, EXTENDED RELEASE ORAL at 08:29

## 2024-06-17 RX ADMIN — ACETAMINOPHEN 650 MG: 325 TABLET, FILM COATED ORAL at 04:55

## 2024-06-17 NOTE — LACTATION NOTE
This note was copied from a baby's chart.  Met with Norma and Javier and The Discharge Breastfeeding Booklet was briefly reviewed and questions answered.      Went over the Breastfeeding Log emphasizing the importance of 8-12 feedings in a 24 hour period and monitoring the baby's output.( number of stools and wets for a 24 hour period).     Storage and preparation of breast milk and when to start pumping reviewed    Discussed s/s engorgement, blocked milk ducts, and mastitis. Discussed how to remedy at home and when to contact physician. Norma states that she did have Mastitis with her 16 month old son. She was unaware that [facility] had a procedure for plugged ducts.Information was reviewed.    Baby was latched at the breast on entering room, positioning was slightly off and baby was slightly puckered at the breast. Mom was able to reposition her baby independently and achieve a deep latch.    Norma is hoping for discharge today pending her blood pressure readings.    Encouraged parents to call if the have any additional questions or need breastfeeding support.     Parents were also provided with the [facility] Information for follow up lactation support as needed.

## 2024-06-17 NOTE — PLAN OF CARE
Problem: POSTPARTUM  Goal: Experiences normal postpartum course  Description: INTERVENTIONS:  - Monitor maternal vital signs  - Assess uterine involution and lochia  Outcome: Progressing  Goal: Appropriate maternal -  bonding  Description: INTERVENTIONS:  - Identify family support  - Assess for appropriate maternal/infant bonding   -Encourage maternal/infant bonding opportunities  - Referral to  or  as needed  Outcome: Progressing  Goal: Establishment of infant feeding pattern  Description: INTERVENTIONS:  - Assess breast/bottle feeding  - Refer to lactation as needed  Outcome: Progressing  Goal: Incision(s), wounds(s) or drain site(s) healing without S/S of infection  Description: INTERVENTIONS  - Assess and document dressing, incision, wound bed, drain sites and surrounding tissue  - Provide patient and family education  - Perform skin care/dressing changes elizabeth  Outcome: Progressing     Problem: PAIN - ADULT  Goal: Verbalizes/displays adequate comfort level or baseline comfort level  Description: Interventions:  - Encourage patient to monitor pain and request assistance  - Assess pain using appropriate pain scale  - Administer analgesics based on type and severity of pain and evaluate response  - Implement non-pharmacological measures as appropriate and evaluate response  - Consider cultural and social influences on pain and pain management  - Notify physician/advanced practitioner if interventions unsuccessful or patient reports new pain  Outcome: Progressing     Problem: Knowledge Deficit  Goal: Patient/family/caregiver demonstrates understanding of disease process, treatment plan, medications, and discharge instructions  Description: Complete learning assessment and assess knowledge base.  Interventions:  - Provide teaching at level of understanding  - Provide teaching via preferred learning methods  Outcome: Progressing     Problem: DISCHARGE PLANNING  Goal: Discharge to home  or other facility with appropriate resources  Description: INTERVENTIONS:  - Identify barriers to discharge w/patient and caregiver  - Arrange for needed discharge resources and transportation as appropriate  - Identify discharge learning needs (meds, wound care, etc.)  - Arrange for interpretive services to assist at discharge as needed  - Refer to Case Management Department for coordinating discharge planning if the patient needs post-hospital services based on physician/advanced practitioner order or complex needs related to functional status, cognitive ability, or social support system  Outcome: Progressing

## 2024-06-17 NOTE — CASE MANAGEMENT
Case Management Discharge Planning Note    Patient name Norma Hernandez  Location /-01 MRN [id number]  : 1995 Date 2024       Current Admission Date: 2024  Current Admission Diagnosis:39 weeks gestation of pregnancy   Patient Active Problem List    Diagnosis Date Noted Date Diagnosed    39 weeks gestation of pregnancy 2024     Uterine contractions 2024     Gestational thrombocytopenia (HCC) 2024     Gestational hypertension 2024     Chest pain 2023      (spontaneous vaginal delivery) 2023       LOS (days): 3  Geometric Mean LOS (GMLOS) (days):   Days to GMLOS:     OBJECTIVE:  Risk of Unplanned Readmission Score: 4.85         Current admission status: Inpatient   Preferred Pharmacy:   [facility]  [address]  [address] [place] [address]  Phone: [phone] Fax: [phone]    Primary Care Provider: No primary care provider on file.    Primary Insurance: [employer]  Secondary Insurance:     DISCHARGE DETAILS:    Lia confirmed MOB is eligible for pump. Pump fully covered. Sent out for shipping to home on . Informed MOB of same.

## 2024-06-17 NOTE — PROGRESS NOTES
Progress Note - OB/GYN   Norma Hernandez 29 y.o. female MRN: [id number]  Unit/Bed#:  411-01 Encounter: [id number]      Assessment/Plan    Norma Hernandez is a 29 y.o.  who is PPD 3 s/p  at 39w0d     Gestational hypertension  Assessment & Plan  CBC with platelets 143, CMP wnl  Procardia XL 30 mg, increased to Procardia 60 mg XL on   Continue to monitor blood pressures  Monitor headache response to morning tylenol and motrin    Systolic (24hrs), Av , Min:114 , Max:149   Diastolic (24hrs), Av, Min:67, Max:108        Gestational thrombocytopenia (HCC)  Assessment & Plan  Platelets 143 on admission      (spontaneous vaginal delivery)  Assessment & Plan  FEN: Regular   Pain: Tylenol/Motrin   Encourage breastfeeding   Encourage ambulation           Disposition: Anticipate discharge home postpartum Day #3-4  Barriers to discharge: blood pressures      Subjective/Objective     Subjective: Postpartum state    Pain: no  Tolerating PO: yes  Voiding: yes  Flatus: yes  BM: yes  Ambulating: yes  Breastfeeding: breastfeeding  Chest pain: no  Shortness of breath: no  Leg pain: no  Lochia: minimal    Objective:     Vitals:  Vitals:    24 1459 24 1900 24 2300 24 0300   BP: 149/97 114/67 122/76 118/73   BP Location:  Right arm Left arm Right arm   Pulse: 87 85 88 83   Resp:    Temp: 97.8 °F (36.6 °C)  98.5 °F (36.9 °C)    TempSrc: Temporal  Temporal    SpO2:       Weight:       Height:           Physical Exam:   GEN: appears well, alert and oriented x 3, pleasant and cooperative   CV: Regular rate and rhythm  RESP: non labored breathing, lungs clear to auscultation  ABDOMEN: soft, no tenderness, no distention, Uterine fundus firm and non-tender, -1 cm below the umbilicus  EXTREMITIES: non-tender  NEURO Alert and oriented to person, place, and time.       Lab Results   Component Value Date    WBC 11.30 (H) 2024    HGB 13.2 2024    HCT 40.7 2024     MCV 96 06/16/2024     06/16/2024         Tamara Chiang MD  Obstetrics & Gynecology  06/17/24

## 2024-06-17 NOTE — PROGRESS NOTES
All belongings accounted for by patient and  S.O. before leaving. Discharge instructions given and reviewed, questions answered. Mother chose to walk from unit escorted by S.O. pushing stroller/carseat with infant secured in car seat. Also escorted by TIMOTHY Rodriguez

## 2024-06-18 ENCOUNTER — TELEPHONE (OUTPATIENT)
Dept: OTHER | Facility: HOSPITAL | Age: 29
End: 2024-06-18

## 2024-06-18 NOTE — TELEPHONE ENCOUNTER
Patient is enrolled in the Post Partum BP Program.  BP reading at 1815 = 162/104.  Repeat BP at 1845 = 127/92.  Spoke with patient.  She states that she is taking antihypertensive medication as prescribed.  Complained of headache, explaining that she had a headache when discharged from the hospital yesterday and has a history of headaches.  The headache is not relieved by taking motrin or tylenol. She states that she plans to call her physician tomorrow if the headache does not resolve. Denies dizziness, chest discomfort or other symptoms. Message to oncall provider for review.

## 2024-06-18 NOTE — TELEPHONE ENCOUNTER
"Adding supporting documentation from Dr. Mcintosh:    \"If her headache worsens from her baseline or if she develops other symptoms, she should go to triage.\"  "

## 2024-06-22 LAB — PLACENTA IN STORAGE: NORMAL

## 2024-07-09 ENCOUNTER — TELEPHONE (OUTPATIENT)
Dept: PERINATAL CARE | Facility: OTHER | Age: 29
End: 2024-07-09

## 2024-07-09 NOTE — TELEPHONE ENCOUNTER
Blood pressure monitoring program.  Norma had a medium alert this morning at 08 39 for a heart rate of 112.  Her blood pressure was 135/94.  Left message for her to contact her OB with any palpitations, shortness of breath or any other symptoms.  She was asked to resubmit another blood pressure reading and heart rate at her earliest convenience.  OB notified and alert cleared.      Repeat HR at 0909 was 93       1349-message from dr. Arnaldo Dick  Repeat in my office today was 120s/70s. She will continue OBPPBP and is off med

## 2024-07-25 ENCOUNTER — TELEPHONE (OUTPATIENT)
Dept: OBGYN CLINIC | Facility: MEDICAL CENTER | Age: 29
End: 2024-07-25

## 2024-07-25 NOTE — TELEPHONE ENCOUNTER
Call was made to patient regarding the BP monitoring program.  Patient has not submitted blood pressure readings since 7/24.  Called to remind patient to submit readings.  Left reminder message on voicemail to submit BP readings twice daily.

## 2024-07-26 ENCOUNTER — TELEPHONE (OUTPATIENT)
Age: 29
End: 2024-07-26

## 2024-07-26 NOTE — TELEPHONE ENCOUNTER
Patient has been noncompliant in BP monitoring program.  Has not submitted blood pressure readings since 7/24.  Called to remind patient to submit readings.  Left reminder message on voicemail to submit BP readings twice daily.

## 2024-07-27 ENCOUNTER — TELEPHONE (OUTPATIENT)
Dept: OTHER | Facility: HOSPITAL | Age: 29
End: 2024-07-27

## 2024-07-27 NOTE — TELEPHONE ENCOUNTER
Called and left message for patient that she has completed the BP program and the BP cuff is hers to keep.